# Patient Record
Sex: MALE | Race: WHITE | Employment: FULL TIME | URBAN - METROPOLITAN AREA
[De-identification: names, ages, dates, MRNs, and addresses within clinical notes are randomized per-mention and may not be internally consistent; named-entity substitution may affect disease eponyms.]

---

## 2017-01-17 ENCOUNTER — OFFICE VISIT (OUTPATIENT)
Dept: INTERNAL MEDICINE CLINIC | Age: 52
End: 2017-01-17

## 2017-01-17 VITALS
OXYGEN SATURATION: 98 % | HEIGHT: 73 IN | SYSTOLIC BLOOD PRESSURE: 134 MMHG | DIASTOLIC BLOOD PRESSURE: 82 MMHG | TEMPERATURE: 97.9 F | HEART RATE: 55 BPM | BODY MASS INDEX: 25.29 KG/M2 | WEIGHT: 190.8 LBS | RESPIRATION RATE: 16 BRPM

## 2017-01-17 DIAGNOSIS — F41.1 GENERALIZED ANXIETY DISORDER: ICD-10-CM

## 2017-01-17 DIAGNOSIS — I10 ESSENTIAL HYPERTENSION: ICD-10-CM

## 2017-01-17 DIAGNOSIS — Z00.00 ROUTINE GENERAL MEDICAL EXAMINATION AT A HEALTH CARE FACILITY: Primary | ICD-10-CM

## 2017-01-17 RX ORDER — BUSPIRONE HYDROCHLORIDE 15 MG/1
15 TABLET ORAL DAILY
Qty: 30 TAB | Refills: 0
Start: 2017-01-17 | End: 2017-04-04 | Stop reason: ALTCHOICE

## 2017-01-17 RX ORDER — ENALAPRIL MALEATE 10 MG/1
TABLET ORAL 2 TIMES DAILY
COMMUNITY
End: 2017-05-05 | Stop reason: SDUPTHER

## 2017-01-17 RX ORDER — BUSPIRONE HYDROCHLORIDE 15 MG/1
15 TABLET ORAL
COMMUNITY
End: 2017-01-17 | Stop reason: SDUPTHER

## 2017-01-17 NOTE — PROGRESS NOTES
CC:  Chief Complaint   Patient presents with    Hypertension    Cholesterol Problem    Depression     HISTORY OF PRESENT ILLNESS  Lizzy Anne is a 49 y.o. male. Presents for follow up evaluation. Last seen in clinic on 3/15/16. He has HTN, hyperlipidemia, irritable bowel syndrome (IBS) with abd pain, depression, and anxiety disorder. Reports he experienced crampy abdominal pain, nausea, and vomiting for a few months. Found to have gallstones and underwent a lap cholecystectomy by Dr. Juan Guzman on 12/5/16. All of his symptoms have resolved. Feels much better now. Denies feeling depressed, but has some anxiety. Feels very concerned about presidential election results. His wife tells him that he is sometimes \"snappy\". Sometimes has feelings of dread, like something bad is going to happen; states he has not had that feeling in past few weeks. Taking Buspar 15 mg once daily only 1-2 times a week.      Soc Hx  . Has 2 daughters (oldest in college at [employer] is pre-med;  youngest daughter also wants to be a doctor). Works in "[employer] for [employer]; works from home. Never smoker. Drinks 1-2 beers a month (previously, 3-4 beers a day on weekends). Denies recreational drug use.  Does not get regular exercise.     Health Maintenance  Td: 9/21/15  Eye exam: 2015 (Dr. Arlin Carvajal)  Colonoscopy: 8/18/2014 (Dr. Stephanie Wesley); normal, repeat in 10 yrs (2024)    ROS  Constitutional: negative for fevers, chills, night sweats  ENT:   negative for sore throat, nasal congestion, ear pains, hoarseness  Respiratory:  negative for cough, hemoptysis, dyspnea,wheezing  CV:   negative for chest pain, palpitations, lower extremity edema  GI:   negative for heartburn, abd pain, nausea, vomiting, diarrhea, constipation  Genitourinary: negative for frequency, dysuria and hematuria  Integument:  negative for rash and pruritus  Musculoskel: negative for myalgias, arthralgias, back pain, muscle weakness, joint pain  Neurological:  negative for headaches, dizziness, gait problems  Behavl/Psych: negative for feelings of depression; positive for anxiety     Patient Active Problem List   Diagnosis Code    IBS (irritable bowel syndrome) K58.9    Essential hypertension I10    Hyperlipidemia E78.5    Depression with anxiety F41.8    Nephrolithiasis N20.0     Past Medical History   Diagnosis Date    ADD (attention deficit disorder)     Cholelithiasis 9/29/14     CT in 8/14. Saw Dr. Robyn Villasenor; asymptomatic gallstones    GERD (gastroesophageal reflux disease)      possibly    Hyperlipidemia      high cholesterol    Hypertension     Nephrolithiasis      KIDNEY STONES    Other recurrent depressive disorders (HCC)      generalized anxiety disorder     Allergies   Allergen Reactions    Zoloft [Sertraline] Other (comments)     Abdominal pain, nausea     Current Outpatient Prescriptions   Medication Sig Dispense Refill    busPIRone (BUSPAR) 15 mg tablet Take 15 mg by mouth. Patient taking once or twice a week      enalapril (VASOTEC) 10 mg tablet Take  by mouth two (2) times a day.  simvastatin (ZOCOR) 80 mg tablet take 1 tablet by mouth every evening 30 Tab 2         PHYSICAL EXAM  Visit Vitals    /82 (BP 1 Location: Left arm, BP Patient Position: Sitting)    Pulse (!) 55    Temp 97.9 °F (36.6 °C) (Oral)    Resp 16    Ht 6' 1\" (1.854 m)    Wt 190 lb 12.8 oz (86.5 kg)    SpO2 98%    BMI 25.17 kg/m2   11 lb wt loss over past 10 months (from 201 lbs in 2/16 to 190 lbs today)    General: Well-developed and well-nourished, no distress. HEENT:  Head normocephalic/atraumatic, no scleral icterus  Lungs:  Clear to ausculation bilaterally. Good air movement. Heart:  Bradycardic, regular rhythm, normal S1 and S2, no murmur, gallop, or rub  Abdomen: Soft, small well-healed scars, non-distended, normal bowel sounds, non-tender, no guarding, masses, rebound tenderness, or HSM. Extremities: No clubbing, cyanosis, or edema.    Neurological: Alert and oriented. Psychiatric: Normal mood and affect. Behavior is normal.     Results for orders placed or performed during the hospital encounter of 12/01/16   EKG, 12 LEAD, INITIAL   Result Value Ref Range    Ventricular Rate 57 BPM    Atrial Rate 57 BPM    P-R Interval 154 ms    QRS Duration 98 ms    Q-T Interval 404 ms    QTC Calculation (Bezet) 393 ms    Calculated P Axis 67 degrees    Calculated R Axis 74 degrees    Calculated T Axis 41 degrees    Diagnosis       Sinus bradycardia  When compared with ECG of 23-FEB-2009 12:23,  Previous ECG has undetermined rhythm, needs review  Confirmed by [facility] ([id number]) on 12/3/2016 3:17:23 PM           ASSESSMENT AND PLAN    ICD-10-CM ICD-9-CM    1. Routine general medical examination at a health care facility O61.11 E53.3 METABOLIC PANEL, COMPREHENSIVE      CBC WITH AUTOMATED DIFF      HEMOGLOBIN A1C WITH EAG      TSH AND FREE T4      LIPID PANEL      PSA SCREENING (SCREENING)      CHRONIC HEPATITIS PANEL      CHRONIC HEPATITIS PANEL   2. Essential hypertension I10 401.9    3. Generalized anxiety disorder F41.1 300.02 busPIRone (BUSPAR) 15 mg tablet       Bravo Stacy was seen today for hypertension, cholesterol problem and depression. Diagnoses and all orders for this visit:    Routine general medical examination at a health care facility  -     METABOLIC PANEL, COMPREHENSIVE; Future  -     CBC WITH AUTOMATED DIFF; Future  -     HEMOGLOBIN A1C WITH EAG; Future  -     TSH AND FREE T4; Future  -     LIPID PANEL; Future  -     PSA SCREENING (SCREENING); Future  -     CHRONIC HEPATITIS PANEL  -     CHRONIC HEPATITIS PANEL; Future    Essential hypertension  Well-controlled by diet control alone. Generalized anxiety disorder  Increase buspirone to 5 days a week to improve anxiety. -     busPIRone (BUSPAR) 15 mg tablet; Take 1 Tab by mouth daily. Take on Monday-Friday.     Follow-up Disposition:  Return in about 6 months (around 7/17/2017), or if symptoms worsen or fail to improve, for HTN, anxiety. Provided patient and/or family with advanced directive information and answered pertinent questions. Encouraged patient to provide a copy of advanced directive to the office when available. I have discussed the diagnosis with the patient and the intended plan as seen in the above orders. Patient is in agreement. The patient has received an after-visit summary and questions were answered concerning future plans. I have discussed medication side effects and warnings with the patient as well.

## 2017-01-17 NOTE — PROGRESS NOTES
Reviewed record  In preparation for visit and have obtained necessary documentation. 1. Have you been to the ER, urgent care clinic since your last visit? Hospitalized since your last visit?had gall bladder surgery with AWILDA Valdez   2. Have you seen or consulted any other health care providers outside of the [address] [place] [address] since your last visit? Include any pap smears or colon screening. No  Patient declines information on advanced directives.

## 2017-01-17 NOTE — MR AVS SNAPSHOT
Visit Information Date & Time Provider Department Dept. Phone Encounter #  
 1/17/2017  2:00 PM Sherrell MonkMai [phone] [id number] Follow-up Instructions Return in about 6 months (around 7/17/2017), or if symptoms worsen or fail to improve, for HTN, anxiety. Upcoming Health Maintenance Date Due COLONOSCOPY 8/18/2024 DTaP/Tdap/Td series (2 - Td) 9/21/2025 Allergies as of 1/17/2017  Review Complete On: 1/17/2017 By: Sherrell Monk MD  
  
 Severity Noted Reaction Type Reactions Zoloft [Sertraline]  06/17/2015    Other (comments) Abdominal pain, nausea Current Immunizations  Reviewed on 9/28/2016 Name Date Hep B Vaccine (Adult) 7/13/2016, 4/18/2016, 3/15/2016 Influenza Vaccine Loletha Ringer) 9/27/2016, 10/9/2015 Tdap 9/21/2015 Not reviewed this visit You Were Diagnosed With   
  
 Codes Comments Routine general medical examination at a health care facility    -  Primary ICD-10-CM: Z00.00 ICD-9-CM: V70.0 Essential hypertension     ICD-10-CM: I10 
ICD-9-CM: 401.9 Generalized anxiety disorder     ICD-10-CM: F41.1 ICD-9-CM: 300.02 Vitals BP Pulse Temp Resp Height(growth percentile) Weight(growth percentile) 134/82 (BP 1 Location: Left arm, BP Patient Position: Sitting) (!) 55 97.9 °F (36.6 °C) (Oral) 16 6' 1\" (1.854 m) 190 lb 12.8 oz (86.5 kg) SpO2 BMI Smoking Status 98% 25.17 kg/m2 Never Smoker BMI and BSA Data Body Mass Index Body Surface Area  
 25.17 kg/m 2 2.11 m 2 Preferred Pharmacy Pharmacy Name Phone [id number] [address], [address] [phone] Your Updated Medication List  
  
   
This list is accurate as of: 1/17/17  2:38 PM.  Always use your most recent med list.  
  
  
  
  
 busPIRone 15 mg tablet Commonly known as:  BUSPAR Take 15 mg by mouth. Patient taking once or twice a week * VASOTEC 10 mg tablet Generic drug:  enalapril Take  by mouth two (2) times a day. * enalapril 10 mg tablet Commonly known as:  VASOTEC  
take 1 1/2 tablets by mouth once daily for hypertension  
  
 simvastatin 80 mg tablet Commonly known as:  ZOCOR  
take 1 tablet by mouth every evening * Notice: This list has 2 medication(s) that are the same as other medications prescribed for you. Read the directions carefully, and ask your doctor or other care provider to review them with you. We Performed the Following CHRONIC HEPATITIS PANEL [[id number] Custom] Follow-up Instructions Return in about 6 months (around 7/17/2017), or if symptoms worsen or fail to improve, for HTN, anxiety. To-Do List   
 01/18/2017 Lab:  CBC WITH AUTOMATED DIFF   
  
 01/18/2017 Lab:  CHRONIC HEPATITIS PANEL   
  
 01/18/2017 Lab:  HEMOGLOBIN A1C WITH EAG   
  
 01/18/2017 Lab:  LIPID PANEL   
  
 01/18/2017 Lab:  METABOLIC PANEL, COMPREHENSIVE   
  
 01/18/2017 Lab:  PSA SCREENING (SCREENING)   
  
 01/18/2017 Lab:  TSH AND FREE T4 Patient Instructions Well Visit, Men 48 to 72: Care Instructions Your Care Instructions Physical exams can help you stay healthy. Your doctor has checked your overall health and may have suggested ways to take good care of yourself. He or she also may have recommended tests. At home, you can help prevent illness with healthy eating, regular exercise, and other steps. Follow-up care is a key part of your treatment and safety. Be sure to make and go to all appointments, and call your doctor if you are having problems. It's also a good idea to know your test results and keep a list of the medicines you take. How can you care for yourself at home? · Reach and stay at a healthy weight. This will lower your risk for many problems, such as obesity, diabetes, heart disease, and high blood pressure. · Get at least 30 minutes of exercise on most days of the week. Walking is a good choice. You also may want to do other activities, such as running, swimming, cycling, or playing tennis or team sports. · Do not smoke. Smoking can make health problems worse. If you need help quitting, talk to your doctor about stop-smoking programs and medicines. These can increase your chances of quitting for good. · Protect your skin from too much sun. When you're outdoors from 10 a.m. to 4 p.m., stay in the shade or cover up with clothing and a hat with a wide brim. Wear sunglasses that block UV rays. Even when it's cloudy, put broad-spectrum sunscreen (SPF 30 or higher) on any exposed skin. · See a dentist one or two times a year for checkups and to have your teeth cleaned. · Wear a seat belt in the car. · Limit alcohol to 2 drinks a day. Too much alcohol can cause health problems. Follow your doctor's advice about when to have certain tests. These tests can spot problems early. · Cholesterol. Your doctor will tell you how often to have this done based on your overall health and other things that can increase your risk for heart attack and stroke. · Blood pressure. Have your blood pressure checked during a routine doctor visit. Your doctor will tell you how often to check your blood pressure based on your age, your blood pressure results, and other factors. · Prostate exam. Talk to your doctor about whether you should have a blood test (called a PSA test) for prostate cancer. Experts disagree on whether men should have this test. Some experts recommend that you discuss the benefits and risks of the test with your doctor. · Diabetes. Ask your doctor whether you should have tests for diabetes. · Vision. Some experts recommend that you have yearly exams for glaucoma and other age-related eye problems starting at age 48. · Hearing. Tell your doctor if you notice any change in your hearing.  You can have tests to find out how well you hear. · Colon cancer. You should begin tests for colon cancer at age 48. You may have one of several tests. Your doctor will tell you how often to have tests based on your age and risk. Risks include whether you already had a precancerous polyp removed from your colon or whether your parent, brother, sister, or child has had colon cancer. · Heart attack and stroke risk. At least every 4 to 6 years, you should have your risk for heart attack and stroke assessed. Your doctor uses factors such as your age, blood pressure, cholesterol, and whether you smoke or have diabetes to show what your risk for a heart attack or stroke is over the next 10 years. · Abdominal aortic aneurysm. Ask your doctor whether you should have a test to check for an aneurysm. You may need a test if you ever smoked or if your parent, brother, sister, or child has had an aneurysm. When should you call for help? Watch closely for changes in your health, and be sure to contact your doctor if you have any problems or symptoms that concern you. Where can you learn more? Go to http://kenneth-peter.info/. Enter F198 in the search box to learn more about \"Well Visit, Men 48 to 72: Care Instructions. \" Current as of: July 19, 2016 Content Version: 11.1 © 5154-8927 [employer]. Care instructions adapted under license by [employer] (which disclaims liability or warranty for this information). If you have questions about a medical condition or this instruction, always ask your healthcare professional. [place] [address] any warranty or liability for your use of this information. Introducing [facility]! Ora Viveros introduces [employer] patient portal. Now you can access parts of your medical record, email your doctor's office, and request medication refills online.    
 
1. In your internet browser, go to [email or web address] "[email or web address]/BizBraghart 2. Click on the First Time User? Click Here link in the Sign In box. You will see the New Member Sign Up page. 3. Enter your SpaBooker Access Code exactly as it appears below. You will not need to use this code after youve completed the sign-up process. If you do not sign up before the expiration date, you must request a new code. · SpaBooker Access Code: [email or web address] Expires: 2/15/2017 10:33 PM 
 
4. Enter the last four digits of your Social Security Number (xxxx) and Date of Birth (mm/dd/yyyy) as indicated and click Submit. You will be taken to the next sign-up page. 5. Create a Whisk (formerly Zypsee)t ID. This will be your SpaBooker login ID and cannot be changed, so think of one that is secure and easy to remember. 6. Create a SpaBooker password. You can change your password at any time. 7. Enter your Password Reset Question and Answer. This can be used at a later time if you forget your password. 8. Enter your e-mail address. You will receive e-mail notification when new information is available in [address] 9. Click Sign Up. You can now view and download portions of your medical record. 10. Click the Download Summary menu link to download a portable copy of your medical information. If you have questions, please visit the Frequently Asked Questions section of the SpaBooker website. Remember, SpaBooker is NOT to be used for urgent needs. For medical emergencies, dial 911. Now available from your iPhone and Android! Please provide this summary of care documentation to your next provider. Your primary care clinician is listed as Kalee Burns. If you have any questions after today's visit, please call [phone].

## 2017-01-17 NOTE — PATIENT INSTRUCTIONS

## 2017-01-20 ENCOUNTER — APPOINTMENT (OUTPATIENT)
Dept: INTERNAL MEDICINE CLINIC | Age: 52
End: 2017-01-20

## 2017-01-20 DIAGNOSIS — Z00.00 ROUTINE GENERAL MEDICAL EXAMINATION AT A HEALTH CARE FACILITY: ICD-10-CM

## 2017-01-23 LAB
ALBUMIN SERPL-MCNC: 4.7 G/DL (ref 3.5–5.5)
ALBUMIN/GLOB SERPL: 1.9 {RATIO} (ref 1.1–2.5)
ALP SERPL-CCNC: 88 IU/L (ref 39–117)
ALT SERPL-CCNC: 24 IU/L (ref 0–44)
AST SERPL-CCNC: 17 IU/L (ref 0–40)
BASOPHILS # BLD AUTO: 0 X10E3/UL (ref 0–0.2)
BASOPHILS NFR BLD AUTO: 0 %
BILIRUB SERPL-MCNC: 0.6 MG/DL (ref 0–1.2)
BUN SERPL-MCNC: 10 MG/DL (ref 6–24)
BUN/CREAT SERPL: 11 (ref 9–20)
CALCIUM SERPL-MCNC: 9.5 MG/DL (ref 8.7–10.2)
CHLORIDE SERPL-SCNC: 100 MMOL/L (ref 96–106)
CHOLEST SERPL-MCNC: 179 MG/DL (ref 100–199)
CO2 SERPL-SCNC: 28 MMOL/L (ref 18–29)
COMMENT, 144067: NORMAL
CREAT SERPL-MCNC: 0.95 MG/DL (ref 0.76–1.27)
EOSINOPHIL # BLD AUTO: 0.1 X10E3/UL (ref 0–0.4)
EOSINOPHIL NFR BLD AUTO: 2 %
ERYTHROCYTE [DISTWIDTH] IN BLOOD BY AUTOMATED COUNT: 14.2 % (ref 12.3–15.4)
EST. AVERAGE GLUCOSE BLD GHB EST-MCNC: 111 MG/DL
GLOBULIN SER CALC-MCNC: 2.5 G/DL (ref 1.5–4.5)
GLUCOSE SERPL-MCNC: 89 MG/DL (ref 65–99)
HBA1C MFR BLD: 5.5 % (ref 4.8–5.6)
HBV CORE AB SERPL QL IA: NEGATIVE
HBV CORE IGM SERPL QL IA: NEGATIVE
HBV E AB SERPL QL IA: NEGATIVE
HBV E AG SERPL QL IA: NEGATIVE
HBV SURFACE AB SER QL: NON REACTIVE
HBV SURFACE AG SERPL QL IA: NEGATIVE
HCT VFR BLD AUTO: 43.9 % (ref 37.5–51)
HCV AB S/CO SERPL IA: <0.1 S/CO RATIO (ref 0–0.9)
HDLC SERPL-MCNC: 46 MG/DL
HGB BLD-MCNC: 14.6 G/DL (ref 12.6–17.7)
IMM GRANULOCYTES # BLD: 0 X10E3/UL (ref 0–0.1)
IMM GRANULOCYTES NFR BLD: 0 %
INTERPRETATION, 910389: NORMAL
LDLC SERPL CALC-MCNC: 94 MG/DL (ref 0–99)
LYMPHOCYTES # BLD AUTO: 1.7 X10E3/UL (ref 0.7–3.1)
LYMPHOCYTES NFR BLD AUTO: 36 %
MCH RBC QN AUTO: 30.2 PG (ref 26.6–33)
MCHC RBC AUTO-ENTMCNC: 33.3 G/DL (ref 31.5–35.7)
MCV RBC AUTO: 91 FL (ref 79–97)
MONOCYTES # BLD AUTO: 0.4 X10E3/UL (ref 0.1–0.9)
MONOCYTES NFR BLD AUTO: 9 %
NEUTROPHILS # BLD AUTO: 2.6 X10E3/UL (ref 1.4–7)
NEUTROPHILS NFR BLD AUTO: 53 %
PLATELET # BLD AUTO: 257 X10E3/UL (ref 150–379)
POTASSIUM SERPL-SCNC: 4.5 MMOL/L (ref 3.5–5.2)
PROT SERPL-MCNC: 7.2 G/DL (ref 6–8.5)
PSA SERPL-MCNC: 0.9 NG/ML (ref 0–4)
RBC # BLD AUTO: 4.83 X10E6/UL (ref 4.14–5.8)
SODIUM SERPL-SCNC: 143 MMOL/L (ref 134–144)
T4 FREE SERPL-MCNC: 1.16 NG/DL (ref 0.82–1.77)
TRIGL SERPL-MCNC: 194 MG/DL (ref 0–149)
TSH SERPL DL<=0.005 MIU/L-ACNC: 2.36 UIU/ML (ref 0.45–4.5)
VLDLC SERPL CALC-MCNC: 39 MG/DL (ref 5–40)
WBC # BLD AUTO: 4.8 X10E3/UL (ref 3.4–10.8)

## 2017-01-24 NOTE — PROGRESS NOTES
Inform patient that all his labs returned normal, including his kidney and liver tests, blood counts, diabetes screening test, thyroid, cholesterol, and prostate blood test. Keep up the good work!

## 2017-02-28 RX ORDER — SIMVASTATIN 80 MG/1
TABLET, FILM COATED ORAL
Qty: 30 TAB | Refills: 2 | Status: SHIPPED | OUTPATIENT
Start: 2017-02-28 | End: 2017-06-18 | Stop reason: SDUPTHER

## 2017-04-04 ENCOUNTER — OFFICE VISIT (OUTPATIENT)
Dept: INTERNAL MEDICINE CLINIC | Age: 52
End: 2017-04-04

## 2017-04-04 VITALS
TEMPERATURE: 98 F | DIASTOLIC BLOOD PRESSURE: 81 MMHG | SYSTOLIC BLOOD PRESSURE: 134 MMHG | HEART RATE: 69 BPM | RESPIRATION RATE: 16 BRPM | WEIGHT: 190 LBS | HEIGHT: 73 IN | OXYGEN SATURATION: 97 % | BODY MASS INDEX: 25.18 KG/M2

## 2017-04-04 DIAGNOSIS — I10 ESSENTIAL HYPERTENSION: ICD-10-CM

## 2017-04-04 DIAGNOSIS — K58.0 IRRITABLE BOWEL SYNDROME WITH DIARRHEA: ICD-10-CM

## 2017-04-04 DIAGNOSIS — F41.9 ANXIETY: ICD-10-CM

## 2017-04-04 DIAGNOSIS — R10.32 LEFT LOWER QUADRANT PAIN: Primary | ICD-10-CM

## 2017-04-04 RX ORDER — HYOSCYAMINE SULFATE 0.12 MG/1
TABLET, ORALLY DISINTEGRATING ORAL
Qty: 48 TAB | Refills: 2 | Status: SHIPPED | OUTPATIENT
Start: 2017-04-04 | End: 2019-06-19

## 2017-04-04 RX ORDER — BUSPIRONE HYDROCHLORIDE 15 MG/1
15 TABLET ORAL DAILY
Qty: 30 TAB | Refills: 2 | Status: SHIPPED | OUTPATIENT
Start: 2017-04-04 | End: 2017-07-07 | Stop reason: SDUPTHER

## 2017-04-04 RX ORDER — BUSPIRONE HYDROCHLORIDE 15 MG/1
15 TABLET ORAL 2 TIMES DAILY
COMMUNITY
End: 2017-04-04 | Stop reason: SDUPTHER

## 2017-04-04 NOTE — MR AVS SNAPSHOT
Visit Information Date & Time Provider Department Dept. Phone Encounter #  
 4/4/2017 11:10 AM MD Greg Melchor [phone] [id number] Follow-up Instructions Return in about 1 month (around 5/4/2017), or if symptoms worsen or fail to improve, for Anxiety. Your Appointments 7/17/2017  1:00 PM  
ROUTINE CARE with MD Greg Melchor [address] [facility]) Appt Note: 6mth f/u; HTN, anxiety [address] [facility] [address] [phone]  
  
   
 8 [facility] [address] Upcoming Health Maintenance Date Due COLONOSCOPY 8/18/2024 DTaP/Tdap/Td series (2 - Td) 9/21/2025 Allergies as of 4/4/2017  Review Complete On: 4/4/2017 By: Muriel Dong MD  
  
 Severity Noted Reaction Type Reactions Zoloft [Sertraline]  06/17/2015    Other (comments) Abdominal pain, nausea Current Immunizations  Reviewed on 9/28/2016 Name Date Hep B Vaccine (Adult) 7/13/2016, 4/18/2016, 3/15/2016 Influenza Vaccine Otila Bam) 9/27/2016, 10/9/2015 Tdap 9/21/2015 Not reviewed this visit You Were Diagnosed With   
  
 Codes Comments Left lower quadrant pain    -  Primary ICD-10-CM: R10.32 
ICD-9-CM: 789.04 Essential hypertension     ICD-10-CM: I10 
ICD-9-CM: 401.9 Irritable bowel syndrome with diarrhea     ICD-10-CM: K58.0 ICD-9-CM: 379.8 Anxiety     ICD-10-CM: F41.9 ICD-9-CM: 300.00 Vitals BP Pulse Temp Resp Height(growth percentile) Weight(growth percentile) 134/81 (BP 1 Location: Left arm, BP Patient Position: Sitting) 69 98 °F (36.7 °C) (Oral) 16 6' 1\" (1.854 m) 190 lb (86.2 kg) SpO2 BMI Smoking Status 97% 25.07 kg/m2 Never Smoker BMI and BSA Data Body Mass Index Body Surface Area 25.07 kg/m 2 2.11 m 2 Preferred Pharmacy Pharmacy Name Phone [id number] [id number], [address] [phone] Your Updated Medication List  
  
   
This list is accurate as of: 4/4/17 11:33 AM.  Always use your most recent med list.  
  
  
  
  
 busPIRone 15 mg tablet Commonly known as:  BUSPAR Take 1 Tab by mouth daily. Indications: GENERALIZED ANXIETY DISORDER  
  
 hyoscyamine sulfate 0.125 mg tablet Commonly known as:  CYSTOSPAZ  
take 1 tablet by mouth every 4 hours if needed  Indications: Irritable Bowel Syndrome  
  
 simvastatin 80 mg tablet Commonly known as:  ZOCOR  
take 1 tablet by mouth every evening VASOTEC 10 mg tablet Generic drug:  enalapril Take  by mouth two (2) times a day. Prescriptions Sent to Pharmacy Refills  
 busPIRone (BUSPAR) 15 mg tablet 2 Sig: Take 1 Tab by mouth daily. Indications: GENERALIZED ANXIETY DISORDER Class: Normal  
 Pharmacy: [address] Ph #: [phone] Route: Oral  
 hyoscyamine sulfate (CYSTOSPAZ) 0.125 mg tablet 2 Sig: take 1 tablet by mouth every 4 hours if needed  Indications: Irritable Bowel Syndrome Class: Normal  
 Pharmacy: [address] Ph #: [phone] Follow-up Instructions Return in about 1 month (around 5/4/2017), or if symptoms worsen or fail to improve, for Anxiety. Patient Instructions Abdominal Pain: Care Instructions Your Care Instructions Abdominal pain has many possible causes. Some aren't serious and get better on their own in a few days. Others need more testing and treatment. If your pain continues or gets worse, you need to be rechecked and may need more tests to find out what is wrong. You may need surgery to correct the problem. Don't ignore new symptoms, such as fever, nausea and vomiting, urination problems, pain that gets worse, and dizziness. These may be signs of a more serious problem. Your doctor may have recommended a follow-up visit in the next 8 to 12 hours. If you are not getting better, you may need more tests or treatment. The doctor has checked you carefully, but problems can develop later. If you notice any problems or new symptoms, get medical treatment right away. Follow-up care is a key part of your treatment and safety. Be sure to make and go to all appointments, and call your doctor if you are having problems. It's also a good idea to know your test results and keep a list of the medicines you take. How can you care for yourself at home? · Rest until you feel better. · To prevent dehydration, drink plenty of fluids, enough so that your urine is light yellow or clear like water. Choose water and other caffeine-free clear liquids until you feel better. If you have kidney, heart, or liver disease and have to limit fluids, talk with your doctor before you increase the amount of fluids you drink. · If your stomach is upset, eat mild foods, such as rice, dry toast or crackers, bananas, and applesauce. Try eating several small meals instead of two or three large ones. · Wait until 48 hours after all symptoms have gone away before you have spicy foods, alcohol, and drinks that contain caffeine. · Do not eat foods that are high in fat. · Avoid anti-inflammatory medicines such as aspirin, ibuprofen (Advil, Motrin), and naproxen (Aleve). These can cause stomach upset. Talk to your doctor if you take daily aspirin for another health problem. When should you call for help? Call 911 anytime you think you may need emergency care. For example, call if: 
· You passed out (lost consciousness). · You pass maroon or very bloody stools. · You vomit blood or what looks like coffee grounds. · You have new, severe belly pain. Call your doctor now or seek immediate medical care if: 
· Your pain gets worse, especially if it becomes focused in one area of your belly. · You have a new or higher fever. · Your stools are black and look like tar, or they have streaks of blood. · You have unexpected vaginal bleeding. · You have symptoms of a urinary tract infection. These may include: 
¨ Pain when you urinate. ¨ Urinating more often than usual. 
¨ Blood in your urine. · You are dizzy or lightheaded, or you feel like you may faint. Watch closely for changes in your health, and be sure to contact your doctor if: 
· You are not getting better after 1 day (24 hours). Where can you learn more? Go to http://kenneth-peter.info/. Enter S695 in the search box to learn more about \"Abdominal Pain: Care Instructions. \" Current as of: May 27, 2016 Content Version: 11.2 © 9504-2444 [employer]. Care instructions adapted under license by [employer] (which disclaims liability or warranty for this information). If you have questions about a medical condition or this instruction, always ask your healthcare professional. [address] any warranty or liability for your use of this information. Introducing [facility]! Norberto Erickson introduces Daric patient portal. Now you can access parts of your medical record, email your doctor's office, and request medication refills online. 1. In your internet browser, go to [email or web address] 2. Click on the First Time User? Click Here link in the Sign In box. You will see the New Member Sign Up page. 3. Enter your Daric Access Code exactly as it appears below. You will not need to use this code after youve completed the sign-up process. If you do not sign up before the expiration date, you must request a new code. · Daric Access Code: [email or web address] Expires: 7/3/2017 11:16 AM 
 
4. Enter the last four digits of your Social Security Number (xxxx) and Date of Birth (mm/dd/yyyy) as indicated and click Submit.  You will be taken to the next sign-up page. 5. Create a [employer] ID. This will be your [employer] login ID and cannot be changed, so think of one that is secure and easy to remember. 6. Create a [employer] password. You can change your password at any time. 7. Enter your Password Reset Question and Answer. This can be used at a later time if you forget your password. 8. Enter your e-mail address. You will receive e-mail notification when new information is available in [address]. 9. Click Sign Up. You can now view and download portions of your medical record. 10. Click the Download Summary menu link to download a portable copy of your medical information. If you have questions, please visit the Frequently Asked Questions section of the [employer] website. Remember, [employer] is NOT to be used for urgent needs. For medical emergencies, dial 911. Now available from your iPhone and Android! Please provide this summary of care documentation to your next provider. Your primary care clinician is listed as Chris Lucero. If you have any questions after today's visit, please call [phone].

## 2017-04-04 NOTE — PROGRESS NOTES
CC:  Chief Complaint   Patient presents with    Abdominal Pain     LLQ pain/not constant    Medication Refill     buspar -not taking on regular basis     HISTORY OF PRESENT ILLNESS  Jose Agrawal is a 49 y.o. male. Presents for acute visit with complaint of LLQ abdominal pain intermittently since  that worsened about a week ago. Thought at first it was his IBS pain though it felt different; current pain is achy, IBS pain is crampy; took hyoscyamine for a week without improvement. Current LLQ pain is worse with certain changes in body position and when he coughs or sneezes. Worried that he may have a hernia. Denies any injury or trauma, but did much yard work a little over a week ago. Also complains of increased anxiety. Has been taking only 1/2 tablet of Buspar 15 mg daily for past week. Previously was taking 1 tablet 1-2 times a week. PMH: He has HTN, hyperlipidemia, irritable bowel syndrome (IBS) with abd pain, depression, and anxiety disorder. Underwent lap cholecystectomy for gallstones by Dr. Florian Blake on 16.       Soc Hx  . Has 2 daughters (oldest in college at [employer] is pre-med; youngest daughter also wants to be a dentist). Works in IT for [employer]; works from home. Never smoker. Drinks 1-2 beers a month (previously, 3-4 beers a day on weekends). Denies recreational drug use.  Does not get regular exercise.      Health Maintenance  Td: 9/21/15  Eye exam:  (Dr. Jenny Pat)  Colonoscopy: 2014 (Dr. Carolee Gamez); normal, repeat in 10 yrs ()    ROS  Constitutional: negative for fevers, chills, night sweats  ENT:   negative for sore throat, nasal congestion, ear pains  Respiratory:  negative for cough, hemoptysis, dyspnea, wheezing  CV:   negative for chest pain, palpitations, lower extremity edema  GI:   negative for heartburn, abd pain, nausea, vomiting, diarrhea, constipation  Genitourinary: negative for frequency, dysuria and hematuria  Integument:  negative for rash and pruritus  Musculoskel: negative for myalgias, arthralgias, back pain, muscle weakness, joint pain  Neurological:  negative for headaches, dizziness, vertigo, gait problems  Behavl/Psych: negative for feelings of anxiety, depression, mood change     Patient Active Problem List   Diagnosis Code    IBS (irritable bowel syndrome) K58.9    Essential hypertension I10    Hyperlipidemia E78.5    Depression with anxiety F41.8    Nephrolithiasis N20.0     Past Medical History:   Diagnosis Date    ADD (attention deficit disorder)     Cholelithiasis 9/29/14    CT in 8/14. Saw Dr. Saida Sandy; asymptomatic gallstones    GERD (gastroesophageal reflux disease)     possibly    Hyperlipidemia     high cholesterol    Hypertension     Nephrolithiasis     KIDNEY STONES    Other recurrent depressive disorders (HCC)     generalized anxiety disorder     Allergies   Allergen Reactions    Zoloft [Sertraline] Other (comments)     Abdominal pain, nausea     Current Outpatient Prescriptions   Medication Sig Dispense Refill    busPIRone (BUSPAR) 15 mg tablet Take 15 mg by mouth two (2) times a day.  simvastatin (ZOCOR) 80 mg tablet take 1 tablet by mouth every evening 30 Tab 2    enalapril (VASOTEC) 10 mg tablet Take  by mouth two (2) times a day. PHYSICAL EXAM  Visit Vitals    /81 (BP 1 Location: Left arm, BP Patient Position: Sitting)    Pulse 69    Temp 98 °F (36.7 °C) (Oral)    Resp 16    Ht 6' 1\" (1.854 m)    Wt 190 lb (86.2 kg)    SpO2 97%    BMI 25.07 kg/m2     General: Well-developed and well-nourished, no distress. HEENT:  Head normocephalic/atraumatic, no scleral icterus  Lungs:  Clear to ausculation bilaterally. Good air movement. Heart:  Regular rate and rhythm, normal S1 and S2, no murmur, gallop, or rub  Abdomen: Soft, non-distended, normal bowel sounds, no tenderness, no masses, rebound tenderness, or HSM. Extremities: No clubbing, cyanosis, or edema.    Neurological: Alert and oriented. Psychiatric: Normal mood and affect. Behavior is normal.         ASSESSMENT AND PLAN    ICD-10-CM ICD-9-CM    1. Left lower quadrant pain R10.32 789.04    2. Essential hypertension I10 401.9    3. Irritable bowel syndrome with diarrhea K58.0 564.1 hyoscyamine sulfate (CYSTOSPAZ) 0.125 mg tablet   4. Anxiety F41.9 300.00 busPIRone (BUSPAR) 15 mg tablet       Tremaine Davies was seen today for abdominal pain and medication refill. Diagnoses and all orders for this visit:    Left lower quadrant pain  Most likely secondary to muscle strain. Recommended warm compress and OTC ibuprofen or acetaminophen. Essential hypertension  Well-controlled. Continue present management. Irritable bowel syndrome with diarrhea  -     Refill hyoscyamine sulfate (CYSTOSPAZ) 0.125 mg tablet; take 1 tablet by mouth every 4 hours if needed  Indications: Irritable Bowel Syndrome    Anxiety  -     Refill busPIRone (BUSPAR) 15 mg tablet; Take 1 Tab by mouth daily. Indications: GENERALIZED ANXIETY DISORDER    Follow-up Disposition:  Return in about 1 month (around 5/4/2017), or if symptoms worsen or fail to improve, for Anxiety. Provided patient and/or family with advanced directive information and answered pertinent questions. Encouraged patient to provide a copy of advanced directive to the office when available. I have discussed the diagnosis with the patient and the intended plan as seen in the above orders. Patient is in agreement. The patient has received an after-visit summary and questions were answered concerning future plans. I have discussed medication side effects and warnings with the patient as well.

## 2017-04-04 NOTE — PATIENT INSTRUCTIONS

## 2017-04-04 NOTE — PROGRESS NOTES
Reviewed record  In preparation for visit and have obtained necessary documentation. 1. Have you been to the ER, urgent care clinic since your last visit? Hospitalized since your last visit?no  2. Have you seen or consulted any other health care providers outside of the [address] [place] [address] since your last visit? Include any pap smears or colon screening. No  Patient has been given information on advanced directives at a previous visit.

## 2017-05-04 ENCOUNTER — OFFICE VISIT (OUTPATIENT)
Dept: INTERNAL MEDICINE CLINIC | Age: 52
End: 2017-05-04

## 2017-05-04 VITALS
HEIGHT: 73 IN | BODY MASS INDEX: 24.78 KG/M2 | TEMPERATURE: 98 F | WEIGHT: 187 LBS | OXYGEN SATURATION: 98 % | RESPIRATION RATE: 16 BRPM | HEART RATE: 63 BPM | DIASTOLIC BLOOD PRESSURE: 80 MMHG | SYSTOLIC BLOOD PRESSURE: 122 MMHG

## 2017-05-04 DIAGNOSIS — I10 ESSENTIAL HYPERTENSION: ICD-10-CM

## 2017-05-04 DIAGNOSIS — F41.9 ANXIETY: Primary | ICD-10-CM

## 2017-05-04 DIAGNOSIS — R10.32 LEFT LOWER QUADRANT PAIN: ICD-10-CM

## 2017-05-04 DIAGNOSIS — K58.0 IRRITABLE BOWEL SYNDROME WITH DIARRHEA: ICD-10-CM

## 2017-05-04 NOTE — PROGRESS NOTES
Reviewed record  In preparation for visit and have obtained necessary documentation. 1. Have you been to the ER, urgent care clinic since your last visit? Hospitalized since your last visit?no  2. Have you seen or consulted any other health care providers outside of the [address] [place] [address] since your last visit? Include any pap smears or colon screening. No  Patient declines information on advanced directives.

## 2017-05-04 NOTE — PROGRESS NOTES
CC:  Chief Complaint   Patient presents with    Anxiety    Other     lower left abdominal pain     HISTORY OF PRESENT ILLNESS  Alisa Boggs is a 49 y.o. male. Presents for 1 month follow up evaluation. He has HTN, hyperlipidemia, irritable bowel syndrome (IBS) with abd pain, depression, and anxiety disorder. Underwent lap cholecystectomy for gallstones by Dr. Lilliam Waters on 12/5/16. Reports his anxiety has been better since increasing Buspar to 15 mg daily. LLQ abdominal pain also resolved until this morning when he had 3/10 pain after doing light yoga. Soc Hx  . Has 2 daughters (oldest in college at [employer] is pre-med; youngest daughter wants to be a dentist). 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 Never smoker. Drinks 1-2 beers a month (previously, 3-4 beers a day on weekends). Denies recreational drug use.  Does not get regular exercise.      Health Maintenance  Td: 9/21/15  Eye exam: 2015 (Dr. Jose E Lares)  Colonoscopy: 8/18/2014 (Dr. Janna Tucker); normal, repeat in 10 yrs (2024)     ROS  Constitutional: negative for fevers, chills, night sweats  ENT:   negative for sore throat, nasal congestion, ear pains, hoarseness  Respiratory:  negative for cough, hemoptysis, dyspnea,wheezing  CV:   negative for chest pain, palpitations, lower extremity edema  GI:   negative for heartburn, nausea, vomiting, diarrhea, constipation  Genitourinary: negative for frequency, dysuria and hematuria  Integument:  negative for rash and pruritus  Musculoskel: negative for myalgias, arthralgias, back pain, muscle weakness, joint pain  Neurological:  negative for headaches, dizziness, vertigo, gait problems  Behavl/Psych: negative for feelings of depression, mood change     Patient Active Problem List   Diagnosis Code    IBS (irritable bowel syndrome) K58.9    Essential hypertension I10    Hyperlipidemia E78.5    Depression with anxiety F41.8    Nephrolithiasis N20.0     Past Medical History:   Diagnosis Date    ADD (attention deficit disorder)     Cholelithiasis 9/29/14    CT in 8/14. Saw Dr. Ronald Zepeda; asymptomatic gallstones    GERD (gastroesophageal reflux disease)     possibly    Hyperlipidemia     high cholesterol    Hypertension     Nephrolithiasis     KIDNEY STONES    Other recurrent depressive disorders (HCC)     generalized anxiety disorder     Allergies   Allergen Reactions    Zoloft [Sertraline] Other (comments)     Abdominal pain, nausea     Current Outpatient Prescriptions   Medication Sig Dispense Refill    busPIRone (BUSPAR) 15 mg tablet Take 1 Tab by mouth daily. Indications: GENERALIZED ANXIETY DISORDER 30 Tab 2    hyoscyamine sulfate (CYSTOSPAZ) 0.125 mg tablet take 1 tablet by mouth every 4 hours if needed  Indications: Irritable Bowel Syndrome 48 Tab 2    simvastatin (ZOCOR) 80 mg tablet take 1 tablet by mouth every evening 30 Tab 2    enalapril (VASOTEC) 10 mg tablet Take  by mouth two (2) times a day. PHYSICAL EXAM  Visit Vitals    /80 (BP 1 Location: Left arm, BP Patient Position: Sitting)    Pulse 63    Temp 98 °F (36.7 °C) (Oral)    Resp 16    Ht 6' 1\" (1.854 m)    Wt 187 lb (84.8 kg)    SpO2 98%    BMI 24.67 kg/m2       General: Well-developed and well-nourished, no distress. HEENT:  Head normocephalic/atraumatic, no scleral icterus  Lungs:  Clear to ausculation bilaterally. Good air movement. Heart:  Regular rate and rhythm, normal S1 and S2, no murmur, gallop, or rub  Extremities: No clubbing, cyanosis, or edema. Neurological: Alert and oriented. Psychiatric: Normal mood and affect. Behavior is normal.         ASSESSMENT AND PLAN    ICD-10-CM ICD-9-CM    1. Anxiety F41.9 300.00    2. Essential hypertension I10 401.9    3. Left lower quadrant pain R10.32 789.04    4. Irritable bowel syndrome with diarrhea K58.0 564.1      Generalized anxiety better controlled.  LLQ abd pain most likely from muscle strain; also consider IBS-related although it does not feel like his typical IBS pain. HTN well-controlled. Current treatment plan is effective. No change in therapy. Reviewed diet and exercise. Follow-up Disposition:  Return in about 3 months (around 8/4/2017), or if symptoms worsen or fail to improve, for HTN, anxiety, LLQ abd pain. Provided patient and/or family with advanced directive information and answered pertinent questions. Encouraged patient to provide a copy of advanced directive to the office when available. I have discussed the diagnosis with the patient and the intended plan as seen in the above orders. Patient is in agreement. The patient has received an after-visit summary and questions were answered concerning future plans. I have discussed medication side effects and warnings with the patient as well.

## 2017-05-04 NOTE — MR AVS SNAPSHOT
Visit Information Date & Time Provider Department Dept. Phone Encounter #  
 5/4/2017 10:00 AM Greg Melchor [phone] [id number] Follow-up Instructions Return in about 3 months (around 8/4/2017), or if symptoms worsen or fail to improve, for HTN, anxiety, LLQ abd pain. Your Appointments 7/17/2017  1:00 PM  
ROUTINE CARE with MD Greg Melchor [facility] Appt Note: 6mth f/u; HTN, anxiety [address] [place] [address] [phone]  
  
   
 [address] Upcoming Health Maintenance Date Due INFLUENZA AGE 9 TO ADULT 8/1/2017 COLONOSCOPY 8/18/2024 DTaP/Tdap/Td series (2 - Td) 9/21/2025 Allergies as of 5/4/2017  Review Complete On: 5/4/2017 By: Muriel Dong MD  
  
 Severity Noted Reaction Type Reactions Zoloft [Sertraline]  06/17/2015    Other (comments) Abdominal pain, nausea Current Immunizations  Reviewed on 9/28/2016 Name Date Hep B Vaccine (Adult) 7/13/2016, 4/18/2016, 3/15/2016 Influenza Vaccine Otila Bam) 9/27/2016, 10/9/2015 Tdap 9/21/2015 Not reviewed this visit You Were Diagnosed With   
  
 Codes Comments Anxiety    -  Primary ICD-10-CM: F41.9 ICD-9-CM: 300.00 Essential hypertension     ICD-10-CM: I10 
ICD-9-CM: 401.9 Left lower quadrant pain     ICD-10-CM: R10.32 
ICD-9-CM: 789.04 Irritable bowel syndrome with diarrhea     ICD-10-CM: K58.0 ICD-9-CM: 207.2 Vitals BP Pulse Temp Resp Height(growth percentile) Weight(growth percentile) 122/80 (BP 1 Location: Left arm, BP Patient Position: Sitting) 63 98 °F (36.7 °C) (Oral) 16 6' 1\" (1.854 m) 187 lb (84.8 kg) SpO2 BMI Smoking Status 98% 24.67 kg/m2 Never Smoker Vitals History BMI and BSA Data Body Mass Index Body Surface Area  
 24.67 kg/m 2 2.09 m 2 Preferred Pharmacy Pharmacy Name Phone [facility] [address], [address] [phone] Your Updated Medication List  
  
   
This list is accurate as of: 5/4/17 10:27 AM.  Always use your most recent med list.  
  
  
  
  
 busPIRone 15 mg tablet Commonly known as:  BUSPAR Take 1 Tab by mouth daily. Indications: GENERALIZED ANXIETY DISORDER  
  
 hyoscyamine sulfate 0.125 mg tablet Commonly known as:  CYSTOSPAZ  
take 1 tablet by mouth every 4 hours if needed  Indications: Irritable Bowel Syndrome  
  
 simvastatin 80 mg tablet Commonly known as:  ZOCOR  
take 1 tablet by mouth every evening VASOTEC 10 mg tablet Generic drug:  enalapril Take  by mouth two (2) times a day. Follow-up Instructions Return in about 3 months (around 8/4/2017), or if symptoms worsen or fail to improve, for HTN, anxiety, LLQ abd pain. Patient Instructions Irritable Bowel Syndrome: Care Instructions Your Care Instructions Irritable bowel syndrome, or IBS, is a problem with the intestines that causes belly pain, bloating, gas, constipation, and diarrhea. The cause of IBS is not well known. IBS can last for many years, but it does not get worse over time or lead to serious disease. Most people can control their symptoms by changing their diet and reducing stress. Follow-up care is a key part of your treatment and safety. Be sure to make and go to all appointments, and call your doctor if you are having problems. It's also a good idea to know your test results and keep a list of the medicines you take. How can you care for yourself at home? · For constipation: 
¨ Include fruits, vegetables, beans, and whole grains in your diet each day. These foods are high in fiber. ¨ Drink plenty of fluids, enough so that your urine is light yellow or clear like water.  If you have kidney, heart, or liver disease and have to limit fluids, talk with your doctor before you increase the amount of fluids you drink. ¨ Get some exercise every day. Build up slowly to 30 to 60 minutes a day on 5 or more days of the week. ¨ Take a fiber supplement, such as Citrucel or Metamucil, every day if needed. Read and follow all instructions on the label. ¨ Schedule time each day for a bowel movement. Having a daily routine may help. Take your time and do not strain when having a bowel movement. · If you often have diarrhea, limit foods and drinks that make it worse. These are different for each person but may include caffeine (found in coffee, tea, chocolate, and cola drinks), alcohol, fatty foods, gas-producing foods (such as beans, cabbage, and broccoli), some dairy products, and spicy foods. Do not eat candy or gum that contains sorbitol. · Keep a daily diary of what you eat and what symptoms you have. This may help find foods that cause you problems. · Eat slowly. Try to make mealtime relaxing. · Find ways to reduce stress. · Get at least 30 minutes of exercise on most days of the week. Exercise can help reduce tension and prevent constipation. Walking is a good choice. You also may want to do other activities, such as running, swimming, cycling, or playing tennis or team sports. When should you call for help? Call your doctor now or seek immediate medical care if: 
· Your pain is different than usual or occurs with fever. · You lose weight without trying, or you lose your appetite and you do not know why. · Your symptoms often wake you from sleep. · Your stools are black and tarlike or have streaks of blood. Watch closely for changes in your health, and be sure to contact your doctor if: 
· Your IBS symptoms get worse or begin to disrupt your day-to-day life. · You become more tired than usual. 
· Your home treatment stops working. Where can you learn more? Go to http://kenneth-peter.info/. Enter I114 in the search box to learn more about \"Irritable Bowel Syndrome: Care Instructions. \" Current as of: August 9, 2016 Content Version: 11.2 © 4439-2734 [employer]. Care instructions adapted under license by [employer] (which disclaims liability or warranty for this information). If you have questions about a medical condition or this instruction, always ask your healthcare professional. [address] any warranty or liability for your use of this information. Introducing [facility]! Cristinasarah Luana introduces MediTAP patient portal. Now you can access parts of your medical record, email your doctor's office, and request medication refills online. 1. In your internet browser, go to [email or web address] 2. Click on the First Time User? Click Here link in the Sign In box. You will see the New Member Sign Up page. 3. Enter your MediTAP Access Code exactly as it appears below. You will not need to use this code after youve completed the sign-up process. If you do not sign up before the expiration date, you must request a new code. · MediTAP Access Code: [email or web address] Expires: 7/3/2017 11:16 AM 
 
4. Enter the last four digits of your Social Security Number (xxxx) and Date of Birth (mm/dd/yyyy) as indicated and click Submit. You will be taken to the next sign-up page. 5. Create a MediTAP ID. This will be your MediTAP login ID and cannot be changed, so think of one that is secure and easy to remember. 6. Create a MediTAP password. You can change your password at any time. 7. Enter your Password Reset Question and Answer. This can be used at a later time if you forget your password. 8. Enter your e-mail address. You will receive e-mail notification when new information is available in [address] 9. Click Sign Up. You can now view and download portions of your medical record. 10. Click the Download Summary menu link to download a portable copy of your medical information. If you have questions, please visit the Frequently Asked Questions section of the [employer] website. Remember, [employer] is NOT to be used for urgent needs. For medical emergencies, dial 911. Now available from your iPhone and Android! Please provide this summary of care documentation to your next provider. Your primary care clinician is listed as Clarice Ramirez. If you have any questions after today's visit, please call [phone].

## 2017-05-04 NOTE — PATIENT INSTRUCTIONS
Irritable Bowel Syndrome: Care Instructions  Your Care Instructions  Irritable bowel syndrome, or IBS, is a problem with the intestines that causes belly pain, bloating, gas, constipation, and diarrhea. The cause of IBS is not well known. IBS can last for many years, but it does not get worse over time or lead to serious disease. Most people can control their symptoms by changing their diet and reducing stress. Follow-up care is a key part of your treatment and safety. Be sure to make and go to all appointments, and call your doctor if you are having problems. It's also a good idea to know your test results and keep a list of the medicines you take. How can you care for yourself at home? · For constipation:  ¨ Include fruits, vegetables, beans, and whole grains in your diet each day. These foods are high in fiber. ¨ Drink plenty of fluids, enough so that your urine is light yellow or clear like water. If you have kidney, heart, or liver disease and have to limit fluids, talk with your doctor before you increase the amount of fluids you drink. ¨ Get some exercise every day. Build up slowly to 30 to 60 minutes a day on 5 or more days of the week. ¨ Take a fiber supplement, such as Citrucel or Metamucil, every day if needed. Read and follow all instructions on the label. ¨ Schedule time each day for a bowel movement. Having a daily routine may help. Take your time and do not strain when having a bowel movement. · If you often have diarrhea, limit foods and drinks that make it worse. These are different for each person but may include caffeine (found in coffee, tea, chocolate, and cola drinks), alcohol, fatty foods, gas-producing foods (such as beans, cabbage, and broccoli), some dairy products, and spicy foods. Do not eat candy or gum that contains sorbitol. · Keep a daily diary of what you eat and what symptoms you have. This may help find foods that cause you problems. · Eat slowly.  Try to make mealtime relaxing. · Find ways to reduce stress. · Get at least 30 minutes of exercise on most days of the week. Exercise can help reduce tension and prevent constipation. Walking is a good choice. You also may want to do other activities, such as running, swimming, cycling, or playing tennis or team sports. When should you call for help? Call your doctor now or seek immediate medical care if:  · Your pain is different than usual or occurs with fever. · You lose weight without trying, or you lose your appetite and you do not know why. · Your symptoms often wake you from sleep. · Your stools are black and tarlike or have streaks of blood. Watch closely for changes in your health, and be sure to contact your doctor if:  · Your IBS symptoms get worse or begin to disrupt your day-to-day life. · You become more tired than usual.  · Your home treatment stops working. Where can you learn more? Go to http://kenneth-peter.info/. Enter A064 in the search box to learn more about \"Irritable Bowel Syndrome: Care Instructions. \"  Current as of: August 9, 2016  Content Version: 11.2  © 6253-2027 [employer]. Care instructions adapted under license by [employer] (which disclaims liability or warranty for this information). If you have questions about a medical condition or this instruction, always ask your healthcare professional. [address] any warranty or liability for your use of this information.

## 2017-05-05 DIAGNOSIS — I10 ESSENTIAL HYPERTENSION: ICD-10-CM

## 2017-05-05 RX ORDER — ENALAPRIL MALEATE 10 MG/1
TABLET ORAL
Qty: 45 TAB | Refills: 5 | Status: SHIPPED | OUTPATIENT
Start: 2017-05-05 | End: 2017-08-04 | Stop reason: ALTCHOICE

## 2017-08-04 ENCOUNTER — OFFICE VISIT (OUTPATIENT)
Dept: INTERNAL MEDICINE CLINIC | Age: 52
End: 2017-08-04

## 2017-08-04 VITALS
HEART RATE: 60 BPM | DIASTOLIC BLOOD PRESSURE: 87 MMHG | WEIGHT: 188 LBS | RESPIRATION RATE: 16 BRPM | OXYGEN SATURATION: 98 % | TEMPERATURE: 98.6 F | BODY MASS INDEX: 24.92 KG/M2 | HEIGHT: 73 IN | SYSTOLIC BLOOD PRESSURE: 127 MMHG

## 2017-08-04 DIAGNOSIS — F41.1 GENERALIZED ANXIETY DISORDER: ICD-10-CM

## 2017-08-04 DIAGNOSIS — I10 ESSENTIAL HYPERTENSION: Primary | ICD-10-CM

## 2017-08-04 DIAGNOSIS — K58.0 IRRITABLE BOWEL SYNDROME WITH DIARRHEA: ICD-10-CM

## 2017-08-04 DIAGNOSIS — Z13.1 SCREENING FOR DIABETES MELLITUS: ICD-10-CM

## 2017-08-04 DIAGNOSIS — Z12.5 SCREENING FOR PROSTATE CANCER: ICD-10-CM

## 2017-08-04 DIAGNOSIS — E78.2 MIXED HYPERLIPIDEMIA: ICD-10-CM

## 2017-08-04 RX ORDER — ENALAPRIL MALEATE 10 MG/1
10 TABLET ORAL 2 TIMES DAILY
COMMUNITY
End: 2017-10-07 | Stop reason: SDUPTHER

## 2017-08-04 RX ORDER — BUSPIRONE HYDROCHLORIDE 15 MG/1
7.5 TABLET ORAL DAILY
COMMUNITY
End: 2017-11-01 | Stop reason: SDUPTHER

## 2017-08-04 NOTE — PATIENT INSTRUCTIONS

## 2017-08-04 NOTE — PROGRESS NOTES
Reviewed record  In preparation for visit and have obtained necessary documentation. 1. Have you been to the ER, urgent care clinic since your last visit? Hospitalized since your last visit?no  2. Have you seen or consulted any other health care providers outside of the [address] [place] [address] since your last visit? Include any pap smears or colon screening. no  Advanced directives: Patient declines information on advanced directives. Patients vital signs discussed with physician.

## 2017-08-04 NOTE — MR AVS SNAPSHOT
Visit Information Date & Time Provider Department Dept. Phone Encounter #  
 8/4/2017  7:45 AM Jass Dumont MD Keron Jack [phone] [id number] Follow-up Instructions Return in about 6 months (around 2/4/2018), or if symptoms worsen or fail to improve, for HTN, hyperlipidemia; schedule for fasting labs 1 week prior to appt. Upcoming Health Maintenance Date Due INFLUENZA AGE 9 TO ADULT 8/1/2017 COLONOSCOPY 8/18/2024 DTaP/Tdap/Td series (2 - Td) 9/21/2025 Allergies as of 8/4/2017  Review Complete On: 8/4/2017 By: Jass Dumont MD  
  
 Severity Noted Reaction Type Reactions Zoloft [Sertraline]  06/17/2015    Other (comments) Abdominal pain, nausea Current Immunizations  Reviewed on 9/28/2016 Name Date Hep B Vaccine (Adult) 7/13/2016, 4/18/2016, 3/15/2016 Influenza Vaccine Candiss Janet) 9/27/2016, 10/9/2015 Tdap 9/21/2015 Not reviewed this visit You Were Diagnosed With   
  
 Codes Comments Essential hypertension    -  Primary ICD-10-CM: I10 
ICD-9-CM: 401.9 Mixed hyperlipidemia     ICD-10-CM: E78.2 ICD-9-CM: 272.2 Irritable bowel syndrome with diarrhea     ICD-10-CM: K58.0 ICD-9-CM: 784.2 Generalized anxiety disorder     ICD-10-CM: F41.1 ICD-9-CM: 300.02 Vitals BP Pulse Temp Resp Height(growth percentile) Weight(growth percentile) 127/87 (BP 1 Location: Left arm, BP Patient Position: Sitting) 60 98.6 °F (37 °C) (Oral) 16 6' 1\" (1.854 m) 188 lb (85.3 kg) SpO2 BMI Smoking Status 98% 24.8 kg/m2 Never Smoker BMI and BSA Data Body Mass Index Body Surface Area  
 24.8 kg/m 2 2.1 m 2 Preferred Pharmacy Pharmacy Name Phone [facility] [address], [address] [phone] Your Updated Medication List  
  
   
This list is accurate as of: 8/4/17  8:18 AM.  Always use your most recent med list.  
  
  
  
  
 busPIRone 15 mg tablet Commonly known as:  BUSPAR Take 7.5 mg by mouth daily. hyoscyamine sulfate 0.125 mg tablet Commonly known as:  CYSTOSPAZ  
take 1 tablet by mouth every 4 hours if needed  Indications: Irritable Bowel Syndrome  
  
 simvastatin 80 mg tablet Commonly known as:  ZOCOR  
take 1 tablet by mouth every evening VASOTEC 10 mg tablet Generic drug:  enalapril Take 10 mg by mouth two (2) times a day. We Performed the Following LIPID PANEL [75403 CPT(R)] METABOLIC PANEL, COMPREHENSIVE [26937 CPT(R)] Follow-up Instructions Return in about 6 months (around 2/4/2018), or if symptoms worsen or fail to improve, for HTN, hyperlipidemia; schedule for fasting labs 1 week prior to appt. Patient Instructions Well Visit, Men 48 to 72: Care Instructions Your Care Instructions Physical exams can help you stay healthy. Your doctor has checked your overall health and may have suggested ways to take good care of yourself. He or she also may have recommended tests. At home, you can help prevent illness with healthy eating, regular exercise, and other steps. Follow-up care is a key part of your treatment and safety. Be sure to make and go to all appointments, and call your doctor if you are having problems. It's also a good idea to know your test results and keep a list of the medicines you take. How can you care for yourself at home? · Reach and stay at a healthy weight. This will lower your risk for many problems, such as obesity, diabetes, heart disease, and high blood pressure. · Get at least 30 minutes of exercise on most days of the week. Walking is a good choice. You also may want to do other activities, such as running, swimming, cycling, or playing tennis or team sports. · Do not smoke. Smoking can make health problems worse. If you need help quitting, talk to your doctor about stop-smoking programs and medicines. These can increase your chances of quitting for good. · Protect your skin from too much sun. When you're outdoors from 10 a.m. to 4 p.m., stay in the shade or cover up with clothing and a hat with a wide brim. Wear sunglasses that block UV rays. Even when it's cloudy, put broad-spectrum sunscreen (SPF 30 or higher) on any exposed skin. · See a dentist one or two times a year for checkups and to have your teeth cleaned. · Wear a seat belt in the car. · Limit alcohol to 2 drinks a day. Too much alcohol can cause health problems. Follow your doctor's advice about when to have certain tests. These tests can spot problems early. · Cholesterol. Your doctor will tell you how often to have this done based on your overall health and other things that can increase your risk for heart attack and stroke. · Blood pressure. Have your blood pressure checked during a routine doctor visit. Your doctor will tell you how often to check your blood pressure based on your age, your blood pressure results, and other factors. · Prostate exam. Talk to your doctor about whether you should have a blood test (called a PSA test) for prostate cancer. Experts disagree on whether men should have this test. Some experts recommend that you discuss the benefits and risks of the test with your doctor. · Diabetes. Ask your doctor whether you should have tests for diabetes. · Vision. Some experts recommend that you have yearly exams for glaucoma and other age-related eye problems starting at age 48. · Hearing. Tell your doctor if you notice any change in your hearing. You can have tests to find out how well you hear. · Colon cancer. You should begin tests for colon cancer at age 48. You may have one of several tests. Your doctor will tell you how often to have tests based on your age and risk.  Risks include whether you already had a precancerous polyp removed from your colon or whether your parent, brother, sister, or child has had colon cancer. · Heart attack and stroke risk. At least every 4 to 6 years, you should have your risk for heart attack and stroke assessed. Your doctor uses factors such as your age, blood pressure, cholesterol, and whether you smoke or have diabetes to show what your risk for a heart attack or stroke is over the next 10 years. · Abdominal aortic aneurysm. Ask your doctor whether you should have a test to check for an aneurysm. You may need a test if you ever smoked or if your parent, brother, sister, or child has had an aneurysm. When should you call for help? Watch closely for changes in your health, and be sure to contact your doctor if you have any problems or symptoms that concern you. Where can you learn more? Go to http://kenneth-peter.info/. Enter L137 in the search box to learn more about \"Well Visit, Men 48 to 72: Care Instructions. \" Current as of: July 19, 2016 Content Version: 11.3 © 2718-8525 nanoRETE. Care instructions adapted under license by [employer] (which disclaims liability or warranty for this information). If you have questions about a medical condition or this instruction, always ask your healthcare professional. [place] [address] any warranty or liability for your use of this information. Introducing [facility]! Jairo Carmona introduces [employer] patient portal. Now you can access parts of your medical record, email your doctor's office, and request medication refills online. 1. In your internet browser, go to [email or web address] 2. Click on the First Time User? Click Here link in the Sign In box. You will see the New Member Sign Up page. 3. Enter your [employer] Access Code exactly as it appears below. You will not need to use this code after youve completed the sign-up process.  If you do not sign up before the expiration date, you must request a new code. 
 
· [employer] [place] Access Code: [email or web address] Expires: 11/2/2017  8:18 AM 
 
4. Enter the last four digits of your Social Security Number (xxxx) and Date of Birth (mm/dd/yyyy) as indicated and click Submit. You will be taken to the next sign-up page. 5. Create a [employer] [place] ID. This will be your [employer] [place] login ID and cannot be changed, so think of one that is secure and easy to remember. 6. Create a [employer] [place] password. You can change your password at any time. 7. Enter your Password Reset Question and Answer. This can be used at a later time if you forget your password. 8. Enter your e-mail address. You will receive e-mail notification when new information is available in [address] 9. Click Sign Up. You can now view and download portions of your medical record. 10. Click the Download Summary menu link to download a portable copy of your medical information. If you have questions, please visit the Frequently Asked Questions section of the [employer] [place] website. Remember, [employer] [place] is NOT to be used for urgent needs. For medical emergencies, dial 911. Now available from your iPhone and Android! Please provide this summary of care documentation to your next provider. Your primary care clinician is listed as Donald Flor. If you have any questions after today's visit, please call [phone].

## 2017-08-04 NOTE — PROGRESS NOTES
CC:   Chief Complaint   Patient presents with    Hypertension    Anxiety       HISTORY OF PRESENT ILLNESS  Kendra Mehta is a 47 y.o. male. Presents for 3 month follow up evaluation. He has HTN, hyperlipidemia, irritable bowel syndrome (IBS) with abd pain, depression, and anxiety disorder. He has no complaints today. Takes occasional Mylanta and Bitter's drops in ginger ale for abdominal pain and nausea. Feels buspirone 15 mg 1/2 tab 1-2 times daily is helping his anxiety (not taking 1 tab twice daily as instructed at last clinic visit). Cardiovascular Review  The patient has hypertension and hyperlipidemia. He reports taking medications as instructed, no medication side effects noted. He increased his enalapril 10 mg to twice daily because home SBP's in 140's; doing better now. Diet and Lifestyle: generally follows a low fat low cholesterol diet, generally follows a low sodium diet, exercises regularly. Lab review: orders written for new lab studies as appropriate; see orders. Soc Hx  . Has 2 daughters (oldest in college at [employer] is pre-med; youngest daughter wants to be a dentist). Works in [employer] for [employer]; works from home. Never smoker. Drinks 1-2 beers a month (previously, 3-4 beers a day on weekends). Denies recreational drug use.  Walks for exercise.      Health Maintenance  Td: 9/21/15  Eye exam: 2015 (Dr. Solo Grimes); was told he had early glaucoma  Colonoscopy: 8/18/2014 (Dr. Dimitrios James); normal, repeat in 10 yrs (2024)      ROS  Constitutional: negative for fevers, chills, night sweats  ENT:   negative for sore throat, nasal congestion, ear pains, hoarseness  Respiratory:  negative for cough, hemoptysis, dyspnea, wheezing  CV:   negative for chest pain, palpitations, lower extremity edema  GI:   negative for heartburn, nausea, vomiting, diarrhea, constipation  Genitourinary: negative for frequency, dysuria and hematuria; positive for weak urinary stream  Integument:  negative for rash and pruritus  Musculoskel: negative for myalgias, arthralgias, back pain, muscle weakness, joint pain  Neurological:  negative for headaches, dizziness, vertigo, gait problems  Behavl/Psych: negative for feelings of anxiety, depression, mood change     Patient Active Problem List   Diagnosis Code    IBS (irritable bowel syndrome) K58.9    Essential hypertension I10    Hyperlipidemia E78.5    Depression with anxiety F41.8    Nephrolithiasis N20.0     Past Medical History:   Diagnosis Date    ADD (attention deficit disorder)     Cholelithiasis 9/29/14    CT in 8/14. Saw Dr. Patria Gilford; asymptomatic gallstones    GERD (gastroesophageal reflux disease)     possibly    Hyperlipidemia     high cholesterol    Hypertension     Nephrolithiasis     KIDNEY STONES    Other recurrent depressive disorders (HCC)     generalized anxiety disorder     Allergies   Allergen Reactions    Zoloft [Sertraline] Other (comments)     Abdominal pain, nausea     Current Outpatient Prescriptions   Medication Sig Dispense Refill    enalapril (VASOTEC) 10 mg tablet Take 10 mg by mouth two (2) times a day.  busPIRone (BUSPAR) 15 mg tablet Take 7.5 mg by mouth daily.  simvastatin (ZOCOR) 80 mg tablet take 1 tablet by mouth every evening 30 Tab 5    hyoscyamine sulfate (CYSTOSPAZ) 0.125 mg tablet take 1 tablet by mouth every 4 hours if needed  Indications: Irritable Bowel Syndrome 48 Tab 2         PHYSICAL EXAM  Visit Vitals    /87 (BP 1 Location: Left arm, BP Patient Position: Sitting)    Pulse 60    Temp 98.6 °F (37 °C) (Oral)    Resp 16    Ht 6' 1\" (1.854 m)    Wt 188 lb (85.3 kg)    SpO2 98%    BMI 24.8 kg/m2       General: Well-developed and well-nourished, no distress. HEENT:  Head normocephalic/atraumatic, no scleral icterus  Neck: Supple. No carotid bruits, JVD, lymphadenopathy, or thyromegaly. Lungs:  Clear to ausculation bilaterally. Good air movement.   Heart:  Regular rate and rhythm, normal S1 and S2, no murmur, gallop, or rub  Abdomen: Soft, non-distended, normal bowel sounds, tenderness at epigastrium, no guarding, masses, rebound tenderness, or HSM. Extremities: No clubbing, cyanosis, or edema. Neurological: Alert and oriented. Psychiatric: Normal mood and affect. Behavior is normal.       ASSESSMENT AND PLAN    ICD-10-CM ICD-9-CM    1. Essential hypertension B52 088.2 METABOLIC PANEL, COMPREHENSIVE      METABOLIC PANEL, COMPREHENSIVE      CBC WITH AUTOMATED DIFF   2. Mixed hyperlipidemia E78.2 272.2 LIPID PANEL      LIPID PANEL      TSH RFX ON ABNORMAL TO FREE T4   3. Irritable bowel syndrome with diarrhea K58.0 564.1    4. Generalized anxiety disorder F41.1 300.02    5. Screening for diabetes mellitus Z13.1 V77.1 HEMOGLOBIN A1C WITH EAG   6. Screening for prostate cancer Z12.5 V76.44 PSA SCREENING (SCREENING)       Diagnoses and all orders for this visit:    1. Essential hypertension  Controlled, continue present management.  -     METABOLIC PANEL, COMPREHENSIVE  -     METABOLIC PANEL, COMPREHENSIVE; Future  -     CBC WITH AUTOMATED DIFF; Future    2. Mixed hyperlipidemia  TG a little high when last checked in 1/17, but total chol normal. Will recheck today. -     LIPID PANEL  -     LIPID PANEL; Future  -     TSH RFX ON ABNORMAL TO FREE T4; Future    3. Irritable bowel syndrome with diarrhea  Controlled. Continue present management. 4. Generalized anxiety disorder  Controlled on buspirone; okay to continue on lower dose if it is helping him. 5. Screening for diabetes mellitus  -     HEMOGLOBIN A1C WITH EAG; Future    6. Screening for prostate cancer  -     PSA SCREENING (SCREENING); Future      Follow-up Disposition:  Return in about 6 months (around 2/4/2018), or if symptoms worsen or fail to improve, for HTN, hyperlipidemia; schedule for fasting labs 1 week prior to appt. Provided patient and/or family with advanced directive information and answered pertinent questions.   Encouraged patient to provide a copy of advanced directive to the office when available. I have discussed the diagnosis with the patient and the intended plan as seen in the above orders. Patient is in agreement. The patient has received an after-visit summary and questions were answered concerning future plans. I have discussed medication side effects and warnings with the patient as well.

## 2017-08-05 LAB
ALBUMIN SERPL-MCNC: 4.8 G/DL (ref 3.5–5.5)
ALBUMIN/GLOB SERPL: 1.9 {RATIO} (ref 1.2–2.2)
ALP SERPL-CCNC: 91 IU/L (ref 39–117)
ALT SERPL-CCNC: 18 IU/L (ref 0–44)
AST SERPL-CCNC: 16 IU/L (ref 0–40)
BILIRUB SERPL-MCNC: 0.5 MG/DL (ref 0–1.2)
BUN SERPL-MCNC: 10 MG/DL (ref 6–24)
BUN/CREAT SERPL: 10 (ref 9–20)
CALCIUM SERPL-MCNC: 9.8 MG/DL (ref 8.7–10.2)
CHLORIDE SERPL-SCNC: 96 MMOL/L (ref 96–106)
CHOLEST SERPL-MCNC: 177 MG/DL (ref 100–199)
CO2 SERPL-SCNC: 27 MMOL/L (ref 18–29)
CREAT SERPL-MCNC: 0.98 MG/DL (ref 0.76–1.27)
GLOBULIN SER CALC-MCNC: 2.5 G/DL (ref 1.5–4.5)
GLUCOSE SERPL-MCNC: 89 MG/DL (ref 65–99)
HDLC SERPL-MCNC: 53 MG/DL
INTERPRETATION, 910389: NORMAL
LDLC SERPL CALC-MCNC: 86 MG/DL (ref 0–99)
POTASSIUM SERPL-SCNC: 4.6 MMOL/L (ref 3.5–5.2)
PROT SERPL-MCNC: 7.3 G/DL (ref 6–8.5)
SODIUM SERPL-SCNC: 139 MMOL/L (ref 134–144)
TRIGL SERPL-MCNC: 192 MG/DL (ref 0–149)
VLDLC SERPL CALC-MCNC: 38 MG/DL (ref 5–40)

## 2017-08-08 NOTE — PROGRESS NOTES
Your recent labs showed normal kidney and liver tests and normal cholesterol. Keep up the good work!

## 2017-10-06 DIAGNOSIS — I10 ESSENTIAL HYPERTENSION: ICD-10-CM

## 2017-10-07 RX ORDER — ENALAPRIL MALEATE 10 MG/1
TABLET ORAL
Qty: 45 TAB | Refills: 5 | Status: SHIPPED | OUTPATIENT
Start: 2017-10-07 | End: 2018-02-20 | Stop reason: ALTCHOICE

## 2017-11-01 DIAGNOSIS — F41.9 ANXIETY: ICD-10-CM

## 2017-11-01 RX ORDER — BUSPIRONE HYDROCHLORIDE 15 MG/1
TABLET ORAL
Qty: 30 TAB | Refills: 3 | Status: SHIPPED | OUTPATIENT
Start: 2017-11-01 | End: 2018-02-20 | Stop reason: ALTCHOICE

## 2018-02-13 ENCOUNTER — APPOINTMENT (OUTPATIENT)
Dept: INTERNAL MEDICINE CLINIC | Age: 53
End: 2018-02-13

## 2018-02-13 DIAGNOSIS — Z12.5 SCREENING FOR PROSTATE CANCER: ICD-10-CM

## 2018-02-13 DIAGNOSIS — I10 ESSENTIAL HYPERTENSION: ICD-10-CM

## 2018-02-13 DIAGNOSIS — E78.2 MIXED HYPERLIPIDEMIA: ICD-10-CM

## 2018-02-13 DIAGNOSIS — Z13.1 SCREENING FOR DIABETES MELLITUS: ICD-10-CM

## 2018-02-14 LAB
ALBUMIN SERPL-MCNC: 4.8 G/DL (ref 3.5–5.5)
ALBUMIN/GLOB SERPL: 2 {RATIO} (ref 1.2–2.2)
ALP SERPL-CCNC: 84 IU/L (ref 39–117)
ALT SERPL-CCNC: 23 IU/L (ref 0–44)
AST SERPL-CCNC: 20 IU/L (ref 0–40)
BASOPHILS # BLD AUTO: 0 X10E3/UL (ref 0–0.2)
BASOPHILS NFR BLD AUTO: 0 %
BILIRUB SERPL-MCNC: 0.7 MG/DL (ref 0–1.2)
BUN SERPL-MCNC: 11 MG/DL (ref 6–24)
BUN/CREAT SERPL: 10 (ref 9–20)
CALCIUM SERPL-MCNC: 9.8 MG/DL (ref 8.7–10.2)
CHLORIDE SERPL-SCNC: 99 MMOL/L (ref 96–106)
CHOLEST SERPL-MCNC: 248 MG/DL (ref 100–199)
CO2 SERPL-SCNC: 27 MMOL/L (ref 18–29)
CREAT SERPL-MCNC: 1.05 MG/DL (ref 0.76–1.27)
EOSINOPHIL # BLD AUTO: 0.1 X10E3/UL (ref 0–0.4)
EOSINOPHIL NFR BLD AUTO: 1 %
ERYTHROCYTE [DISTWIDTH] IN BLOOD BY AUTOMATED COUNT: 14.6 % (ref 12.3–15.4)
EST. AVERAGE GLUCOSE BLD GHB EST-MCNC: 100 MG/DL
GFR SERPLBLD CREATININE-BSD FMLA CKD-EPI: 82 ML/MIN/1.73
GFR SERPLBLD CREATININE-BSD FMLA CKD-EPI: 95 ML/MIN/1.73
GLOBULIN SER CALC-MCNC: 2.4 G/DL (ref 1.5–4.5)
GLUCOSE SERPL-MCNC: 89 MG/DL (ref 65–99)
HBA1C MFR BLD: 5.1 % (ref 4.8–5.6)
HCT VFR BLD AUTO: 42.5 % (ref 37.5–51)
HDLC SERPL-MCNC: 46 MG/DL
HGB BLD-MCNC: 14.8 G/DL (ref 13–17.7)
IMM GRANULOCYTES # BLD: 0 X10E3/UL (ref 0–0.1)
IMM GRANULOCYTES NFR BLD: 0 %
INTERPRETATION, 910389: NORMAL
LDLC SERPL CALC-MCNC: 165 MG/DL (ref 0–99)
LYMPHOCYTES # BLD AUTO: 1.6 X10E3/UL (ref 0.7–3.1)
LYMPHOCYTES NFR BLD AUTO: 32 %
MCH RBC QN AUTO: 30.3 PG (ref 26.6–33)
MCHC RBC AUTO-ENTMCNC: 34.8 G/DL (ref 31.5–35.7)
MCV RBC AUTO: 87 FL (ref 79–97)
MONOCYTES # BLD AUTO: 0.5 X10E3/UL (ref 0.1–0.9)
MONOCYTES NFR BLD AUTO: 11 %
NEUTROPHILS # BLD AUTO: 2.8 X10E3/UL (ref 1.4–7)
NEUTROPHILS NFR BLD AUTO: 56 %
PLATELET # BLD AUTO: 266 X10E3/UL (ref 150–379)
POTASSIUM SERPL-SCNC: 4.7 MMOL/L (ref 3.5–5.2)
PROT SERPL-MCNC: 7.2 G/DL (ref 6–8.5)
PSA SERPL-MCNC: 1.1 NG/ML (ref 0–4)
RBC # BLD AUTO: 4.88 X10E6/UL (ref 4.14–5.8)
SODIUM SERPL-SCNC: 142 MMOL/L (ref 134–144)
TRIGL SERPL-MCNC: 185 MG/DL (ref 0–149)
TSH SERPL DL<=0.005 MIU/L-ACNC: 3.11 UIU/ML (ref 0.45–4.5)
VLDLC SERPL CALC-MCNC: 37 MG/DL (ref 5–40)
WBC # BLD AUTO: 4.9 X10E3/UL (ref 3.4–10.8)

## 2018-02-20 ENCOUNTER — OFFICE VISIT (OUTPATIENT)
Dept: INTERNAL MEDICINE CLINIC | Age: 53
End: 2018-02-20

## 2018-02-20 VITALS
BODY MASS INDEX: 24.65 KG/M2 | HEART RATE: 60 BPM | SYSTOLIC BLOOD PRESSURE: 123 MMHG | RESPIRATION RATE: 16 BRPM | DIASTOLIC BLOOD PRESSURE: 78 MMHG | WEIGHT: 186 LBS | TEMPERATURE: 97.9 F | HEIGHT: 73 IN | OXYGEN SATURATION: 97 %

## 2018-02-20 DIAGNOSIS — I10 ESSENTIAL HYPERTENSION: Primary | ICD-10-CM

## 2018-02-20 DIAGNOSIS — E78.2 MIXED HYPERLIPIDEMIA: ICD-10-CM

## 2018-02-20 DIAGNOSIS — F41.1 GENERALIZED ANXIETY DISORDER: ICD-10-CM

## 2018-02-20 DIAGNOSIS — Z23 ENCOUNTER FOR IMMUNIZATION: ICD-10-CM

## 2018-02-20 DIAGNOSIS — K58.0 IRRITABLE BOWEL SYNDROME WITH DIARRHEA: ICD-10-CM

## 2018-02-20 RX ORDER — ENALAPRIL MALEATE 10 MG/1
20 TABLET ORAL DAILY
Qty: 180 TAB | Refills: 3 | Status: SHIPPED | OUTPATIENT
Start: 2018-02-20 | End: 2019-06-14 | Stop reason: SDUPTHER

## 2018-02-20 RX ORDER — BUSPIRONE HYDROCHLORIDE 15 MG/1
7.5 TABLET ORAL 2 TIMES DAILY
COMMUNITY
End: 2018-03-23 | Stop reason: DRUGHIGH

## 2018-02-20 RX ORDER — CITALOPRAM 10 MG/1
10 TABLET ORAL DAILY
Qty: 30 TAB | Refills: 3 | Status: SHIPPED | OUTPATIENT
Start: 2018-02-20 | End: 2019-06-19

## 2018-02-20 RX ORDER — SIMVASTATIN 80 MG/1
TABLET, FILM COATED ORAL
Qty: 90 TAB | Refills: 3 | Status: SHIPPED | OUTPATIENT
Start: 2018-02-20 | End: 2019-06-14 | Stop reason: SDUPTHER

## 2018-02-20 RX ORDER — ENALAPRIL MALEATE 10 MG/1
20 TABLET ORAL DAILY
COMMUNITY
End: 2018-02-20 | Stop reason: SDUPTHER

## 2018-02-20 NOTE — PATIENT INSTRUCTIONS
Statins: Care Instructions  Your Care Instructions    Statins are medicines that lower your cholesterol and your risk for a heart attack and stroke. Cholesterol is a type of fat in your blood. If you have too much cholesterol, it can build up in blood vessels. This raises your risk of heart disease, heart attack, and stroke. Statins lower cholesterol by blocking how much your body makes. This prevents cholesterol from building up in your blood vessels. This is called hardening of the arteries. It is the starting point for some heart and blood flow problems, such as heart disease. Statins may also reduce inflammation around the buildup (called plaque). This can lower the risk that the plaque will break apart and lead to a heart attack or stroke. A heart-healthy lifestyle is important for lowering your risk whether you take statins or not. This includes eating healthy foods, being active, staying at a healthy weight, and not smoking. You must take statins regularly for them to work well. If you stop, your cholesterol and your risk will go back up. Examples of statins include:  · Atorvastatin (Lipitor). · Lovastatin (Mevacor). · Pravastatin (Pravachol). · Simvastatin (Zocor). Statins interact with many medicines. So tell your doctor all of the other medicines that you take. These include prescription medicines, over-the-counter medicines, dietary supplements, and herbal products. Follow-up care is a key part of your treatment and safety. Be sure to make and go to all appointments, and call your doctor if you are having problems. It's also a good idea to know your test results and keep a list of the medicines you take. How can you care for yourself at home? · Take statins exactly as your doctor tells you. High cholesterol has no symptoms. So it is easy to forget to take the pills. Try to make a system that reminds you to take them.   · Do not take two or more medicines at the same time unless the doctor told you to. Statins can interact with other medicines. · Always tell your doctor if you think you are having a side effect. If side effects are a problem with one medicine, a different one may be used. · Keep making the lifestyle changes your doctor suggests. Eat heart-healthy foods, be active, don't smoke, and stay at a healthy weight. · Talk to your doctor about avoiding grapefruit juice if you take statins. Grapefruit juice can raise the level of this medicine in your blood. This could increase side effects. When should you call for help? Watch closely for changes in your health, and be sure to contact your doctor if:  ? · You think you are having problems with your medicine. ? · You have aches or muscle pain. Where can you learn more? Go to http://kenneth-peter.info/. Enter R358 in the search box to learn more about \"Statins: Care Instructions. \"  Current as of: September 21, 2016  Content Version: 11.4  © 3924-1845 [employer]. Care instructions adapted under license by [employer] (which disclaims liability or warranty for this information). If you have questions about a medical condition or this instruction, always ask your healthcare professional. [place] [address] any warranty or liability for your use of this information. Anxiety Disorder: Care Instructions  Your Care Instructions    Anxiety is a normal reaction to stress. Difficult situations can cause you to have symptoms such as sweaty palms and a nervous feeling. In an anxiety disorder, the symptoms are far more severe. Constant worry, muscle tension, trouble sleeping, nausea and diarrhea, and other symptoms can make normal daily activities difficult or impossible. These symptoms may occur for no reason, and they can affect your work, school, or social life. Medicines, counseling, and self-care can all help. Follow-up care is a key part of your treatment and safety.  Be sure to make and go to all appointments, and call your doctor if you are having problems. It's also a good idea to know your test results and keep a list of the medicines you take. How can you care for yourself at home? · Take medicines exactly as directed. Call your doctor if you think you are having a problem with your medicine. · Go to your counseling sessions and follow-up appointments. · Recognize and accept your anxiety. Then, when you are in a situation that makes you anxious, say to yourself, \"This is not an emergency. I feel uncomfortable, but I am not in danger. I can keep going even if I feel anxious. \"  · Be kind to your body:  ¨ Relieve tension with exercise or a massage. ¨ Get enough rest.  ¨ Avoid alcohol, caffeine, nicotine, and illegal drugs. They can increase your anxiety level and cause sleep problems. ¨ Learn and do relaxation techniques. See below for more about these techniques. · Engage your mind. Get out and do something you enjoy. Go to a funny movie, or take a walk or hike. Plan your day. Having too much or too little to do can make you anxious. · Keep a record of your symptoms. Discuss your fears with a good friend or family member, or join a support group for people with similar problems. Talking to others sometimes relieves stress. · Get involved in social groups, or volunteer to help others. Being alone sometimes makes things seem worse than they are. · Get at least 30 minutes of exercise on most days of the week to relieve stress. Walking is a good choice. You also may want to do other activities, such as running, swimming, cycling, or playing tennis or team sports. Relaxation techniques  Do relaxation exercises 10 to 20 minutes a day. You can play soothing, relaxing music while you do them, if you wish. · Tell others in your house that you are going to do your relaxation exercises. Ask them not to disturb you.   · Find a comfortable place, away from all distractions and noise.  · Lie down on your back, or sit with your back straight. · Focus on your breathing. Make it slow and steady. · Breathe in through your nose. Breathe out through either your nose or mouth. · Breathe deeply, filling up the area between your navel and your rib cage. Breathe so that your belly goes up and down. · Do not hold your breath. · Breathe like this for 5 to 10 minutes. Notice the feeling of calmness throughout your whole body. As you continue to breathe slowly and deeply, relax by doing the following for another 5 to 10 minutes:  · Tighten and relax each muscle group in your body. You can begin at your toes and work your way up to your head. · Imagine your muscle groups relaxing and becoming heavy. · Empty your mind of all thoughts. · Let yourself relax more and more deeply. · Become aware of the state of calmness that surrounds you. · When your relaxation time is over, you can bring yourself back to alertness by moving your fingers and toes and then your hands and feet and then stretching and moving your entire body. Sometimes people fall asleep during relaxation, but they usually wake up shortly afterward. · Always give yourself time to return to full alertness before you drive a car or do anything that might cause an accident if you are not fully alert. Never play a relaxation tape while you drive a car. When should you call for help? Call 911 anytime you think you may need emergency care. For example, call if:  ? · You feel you cannot stop from hurting yourself or someone else. ? Keep the numbers for these national suicide hotlines: 6-080-042-TALK (4-331.141.6690) and 5-322-CYLXSGX (4-215.364.9002). If you or someone you know talks about suicide or feeling hopeless, get help right away. ? Watch closely for changes in your health, and be sure to contact your doctor if:  ? · You have anxiety or fear that affects your life.    ? · You have symptoms of anxiety that are new or different from those you had before. Where can you learn more? Go to http://kenneth-peter.info/. Enter P754 in the search box to learn more about \"Anxiety Disorder: Care Instructions. \"  Current as of: May 12, 2017  Content Version: 11.4  © 1002-4277 [employer], [email or web address]. Care instructions adapted under license by [employer] (which disclaims liability or warranty for this information). If you have questions about a medical condition or this instruction, always ask your healthcare professional. [address] any warranty or liability for your use of this information.

## 2018-02-20 NOTE — PROGRESS NOTES
Yumiko Jackson  Identified pt with two pt identifiers(name and ). Chief Complaint   Patient presents with    Hypertension    Cholesterol Problem    Immunization/Injection       1. Have you been to the ER, urgent care clinic since your last visit? Hospitalized since your last visit? NO    2. Have you seen or consulted any other health care providers outside of the [address] [place] [address] since your last visit? Include any pap smears or colon screening. Saw dermatology Dr Dee Ford provider has been notified of reason for visit, vitals and flowsheets obtained on patients.      Patient received paperwork for advance directive during previous visit but has not completed at this time     Reviewed record In preparation for visit, huddled with provider and have obtained necessary documentation      Health Maintenance Due   Topic    Influenza Age 5 to Adult        Wt Readings from Last 3 Encounters:   18 186 lb (84.4 kg)   17 188 lb (85.3 kg)   17 187 lb (84.8 kg)     Temp Readings from Last 3 Encounters:   18 97.9 °F (36.6 °C) (Oral)   17 98.6 °F (37 °C) (Oral)   17 98 °F (36.7 °C) (Oral)     BP Readings from Last 3 Encounters:   18 123/78   17 127/87   17 122/80     Pulse Readings from Last 3 Encounters:   18 60   17 60   17 63     Vitals:    18 1259   BP: 123/78   Pulse: 60   Resp: 16   Temp: 97.9 °F (36.6 °C)   TempSrc: Oral   SpO2: 97%   Weight: 186 lb (84.4 kg)   Height: 6' 1\" (1.854 m)   PainSc:   0 - No pain         Learning Assessment:  :     Learning Assessment 2015   PRIMARY LEARNER Patient Patient   HIGHEST LEVEL OF EDUCATION - PRIMARY LEARNER  4 YEARS OF COLLEGE 4 YEARS OF COLLEGE   BARRIERS PRIMARY LEARNER NONE NONE   CO-LEARNER CAREGIVER No No   PRIMARY LANGUAGE ENGLISH ENGLISH   LEARNER PREFERENCE PRIMARY LISTENING DEMONSTRATION   ANSWERED BY patients Patient   RELATIONSHIP SELF SELF       Depression Screening:  :     PHQ over the last two weeks 6/16/2015   Little interest or pleasure in doing things Not at all   Feeling down, depressed or hopeless Not at all   Total Score PHQ 2 0       Fall Risk Assessment:  :     No flowsheet data found. Abuse Screening:  :     No flowsheet data found. ADL Screening:  :     ADL Assessment 6/16/2015   Feeding yourself No Help Needed   Getting from bed to chair No Help Needed   Getting dressed No Help Needed   Bathing or showering No Help Needed   Walk across the room (includes cane/walker) No Help Needed   Using the telphone No Help Needed   Taking your medications No Help Needed   Preparing meals No Help Needed   Managing money (expenses/bills) No Help Needed   Moderately strenuous housework (laundry) No Help Needed   Shopping for personal items (toiletries/medicines) No Help Needed   Shopping for groceries No Help Needed   Driving No Help Needed   Climbing a flight of stairs No Help Needed   Getting to places beyond walking distances No Help Needed         Per Dr. Nigel Murdock verbal order read back orders placed for flu vaccine. Influenza vaccine 0.5 ml given right deltoid IM. [address]  # [id number] Lot # [id number]   Exp. Date 6/30/18 VIS given. 5 [facility]  Patient tolerated injection with no complications. Medication reconciliation up to date and corrected with patient at this time.

## 2018-02-20 NOTE — MR AVS SNAPSHOT
[address] [place] [address] [address] [address] [phone] Patient: Eusebio Navarro MRN: [id number] :[id number] Visit Information Date & Time Provider Department Dept. Phone Encounter #  
 2018  1:00 PM [facility] [phone] [id number] Follow-up Instructions Return in about 3 months (around 2018), or if symptoms worsen or fail to improve, for Anxiety follow up. Upcoming Health Maintenance Date Due COLONOSCOPY 2024 DTaP/Tdap/Td series (2 - Td) 2025 Allergies as of 2018  Review Complete On: 2018 By: Lady Rajni MD  
  
 Severity Noted Reaction Type Reactions Zoloft [Sertraline]  2015    Other (comments) Abdominal pain, nausea Current Immunizations  Reviewed on 2016 Name Date Hep B Vaccine (Adult) 2016, 2016, 3/15/2016 Influenza Vaccine Clydie Bulla) 2016, 10/9/2015 Influenza Vaccine (Quad) PF  Incomplete Tdap 2015 Not reviewed this visit You Were Diagnosed With   
  
 Codes Comments Essential hypertension    -  Primary ICD-10-CM: I10 
ICD-9-CM: 401.9 Generalized anxiety disorder     ICD-10-CM: F41.1 ICD-9-CM: 300.02 Mixed hyperlipidemia     ICD-10-CM: E78.2 ICD-9-CM: 272.2 Irritable bowel syndrome with diarrhea     ICD-10-CM: K58.0 ICD-9-CM: 460.1 Encounter for immunization     ICD-10-CM: I27 ICD-9-CM: V03.89 Vitals BP Pulse Temp Resp Height(growth percentile) Weight(growth percentile) 123/78 (BP 1 Location: Left arm, BP Patient Position: Sitting) 60 97.9 °F (36.6 °C) (Oral) 16 6' 1\" (1.854 m) 186 lb (84.4 kg) SpO2 BMI Smoking Status 97% 24.54 kg/m2 Never Smoker BMI and BSA Data Body Mass Index Body Surface Area 24.54 kg/m 2 2.08 m 2 Preferred Pharmacy Pharmacy Name Phone [id number], [address] [phone] Your Updated Medication List  
  
   
This list is accurate as of: 2/20/18  1:35 PM.  Always use your most recent med list.  
  
  
  
  
 busPIRone 15 mg tablet Commonly known as:  BUSPAR Take 7.5 mg by mouth two (2) times a day. enalapril 10 mg tablet Commonly known as:  Brian [place] Take 2 Tabs by mouth daily. Indications: hypertension FISH OIL PO Take 2,000 mg by mouth daily. hyoscyamine sulfate 0.125 mg tablet Commonly known as:  CYSTOSPAZ  
take 1 tablet by mouth every 4 hours if needed  Indications: Irritable Bowel Syndrome OTHER Tumeric Curcumin supplement 500 mg  
  
 simvastatin 80 mg tablet Commonly known as:  ZOCOR  
take 1 tablet by mouth every evening  Indications: hyperlipidemia Prescriptions Sent to Pharmacy Refills  
 enalapril (VASOTEC) 10 mg tablet 3 Sig: Take 2 Tabs by mouth daily. Indications: hypertension Class: Normal  
 Pharmacy: [address] Ph #: [phone] Route: Oral  
 simvastatin (ZOCOR) 80 mg tablet 3 Sig: take 1 tablet by mouth every evening  Indications: hyperlipidemia Class: Normal  
 Pharmacy: [address] Ph #: [phone] We Performed the Following INFLUENZA VIRUS VAC QUAD,SPLIT,PRESV FREE SYRINGE IM [id number] CPT(R)] [place] IMMUNIZ ADMIN,1 SINGLE/COMB VAC/TOXOID [id number] CPT(R)] Follow-up Instructions Return in about 3 months (around 5/20/2018), or if symptoms worsen or fail to improve, for Anxiety follow up. Patient Instructions Statins: Care Instructions Your Care Instructions Statins are medicines that lower your cholesterol and your risk for a heart attack and stroke. Cholesterol is a type of fat in your blood.  If you have too much cholesterol, it can build up in blood vessels. This raises your risk of heart disease, heart attack, and stroke. Statins lower cholesterol by blocking how much your body makes. This prevents cholesterol from building up in your blood vessels. This is called hardening of the arteries. It is the starting point for some heart and blood flow problems, such as heart disease. Statins may also reduce inflammation around the buildup (called plaque). This can lower the risk that the plaque will break apart and lead to a heart attack or stroke. A heart-healthy lifestyle is important for lowering your risk whether you take statins or not. This includes eating healthy foods, being active, staying at a healthy weight, and not smoking. You must take statins regularly for them to work well. If you stop, your cholesterol and your risk will go back up. Examples of statins include: · Atorvastatin (Lipitor). · Lovastatin (Mevacor). · Pravastatin (Pravachol). · Simvastatin (Zocor). Statins interact with many medicines. So tell your doctor all of the other medicines that you take. These include prescription medicines, over-the-counter medicines, dietary supplements, and herbal products. Follow-up care is a key part of your treatment and safety. Be sure to make and go to all appointments, and call your doctor if you are having problems. It's also a good idea to know your test results and keep a list of the medicines you take. How can you care for yourself at home? · Take statins exactly as your doctor tells you. High cholesterol has no symptoms. So it is easy to forget to take the pills. Try to make a system that reminds you to take them. · Do not take two or more medicines at the same time unless the doctor told you to. Statins can interact with other medicines. · Always tell your doctor if you think you are having a side effect. If side effects are a problem with one medicine, a different one may be used. · Keep making the lifestyle changes your doctor suggests. Eat heart-healthy foods, be active, don't smoke, and stay at a healthy weight. · Talk to your doctor about avoiding grapefruit juice if you take statins. Grapefruit juice can raise the level of this medicine in your blood. This could increase side effects. When should you call for help? Watch closely for changes in your health, and be sure to contact your doctor if: 
? · You think you are having problems with your medicine. ? · You have aches or muscle pain. Where can you learn more? Go to http://kennethVillijpeter.info/. Enter R358 in the search box to learn more about \"Statins: Care Instructions. \" Current as of: September 21, 2016 Content Version: 11.4 © 0899-5848 [employer]. Care instructions adapted under license by [employer] (which disclaims liability or warranty for this information). If you have questions about a medical condition or this instruction, always ask your healthcare professional. [place] [address] any warranty or liability for your use of this information. Anxiety Disorder: Care Instructions Your Care Instructions Anxiety is a normal reaction to stress. Difficult situations can cause you to have symptoms such as sweaty palms and a nervous feeling. In an anxiety disorder, the symptoms are far more severe. Constant worry, muscle tension, trouble sleeping, nausea and diarrhea, and other symptoms can make normal daily activities difficult or impossible. These symptoms may occur for no reason, and they can affect your work, school, or social life. Medicines, counseling, and self-care can all help. Follow-up care is a key part of your treatment and safety. Be sure to make and go to all appointments, and call your doctor if you are having problems. It's also a good idea to know your test results and keep a list of the medicines you take. How can you care for yourself at home? · Take medicines exactly as directed. Call your doctor if you think you are having a problem with your medicine. · Go to your counseling sessions and follow-up appointments. · Recognize and accept your anxiety. Then, when you are in a situation that makes you anxious, say to yourself, \"This is not an emergency. I feel uncomfortable, but I am not in danger. I can keep going even if I feel anxious. \" · Be kind to your body: ¨ Relieve tension with exercise or a massage. ¨ Get enough rest. 
¨ Avoid alcohol, caffeine, nicotine, and illegal drugs. They can increase your anxiety level and cause sleep problems. ¨ Learn and do relaxation techniques. See below for more about these techniques. · Engage your mind. Get out and do something you enjoy. Go to a funny movie, or take a walk or hike. Plan your day. Having too much or too little to do can make you anxious. · Keep a record of your symptoms. Discuss your fears with a good friend or family member, or join a support group for people with similar problems. Talking to others sometimes relieves stress. · Get involved in social groups, or volunteer to help others. Being alone sometimes makes things seem worse than they are. · Get at least 30 minutes of exercise on most days of the week to relieve stress. Walking is a good choice. You also may want to do other activities, such as running, swimming, cycling, or playing tennis or team sports. Relaxation techniques Do relaxation exercises 10 to 20 minutes a day. You can play soothing, relaxing music while you do them, if you wish. · Tell others in your house that you are going to do your relaxation exercises. Ask them not to disturb you. · Find a comfortable place, away from all distractions and noise. · Lie down on your back, or sit with your back straight. · Focus on your breathing. Make it slow and steady. · Breathe in through your nose. Breathe out through either your nose or mouth. · Breathe deeply, filling up the area between your navel and your rib cage. Breathe so that your belly goes up and down. · Do not hold your breath. · Breathe like this for 5 to 10 minutes. Notice the feeling of calmness throughout your whole body. As you continue to breathe slowly and deeply, relax by doing the following for another 5 to 10 minutes: · Tighten and relax each muscle group in your body. You can begin at your toes and work your way up to your head. · Imagine your muscle groups relaxing and becoming heavy. · Empty your mind of all thoughts. · Let yourself relax more and more deeply. · Become aware of the state of calmness that surrounds you. · When your relaxation time is over, you can bring yourself back to alertness by moving your fingers and toes and then your hands and feet and then stretching and moving your entire body. Sometimes people fall asleep during relaxation, but they usually wake up shortly afterward. · Always give yourself time to return to full alertness before you drive a car or do anything that might cause an accident if you are not fully alert. Never play a relaxation tape while you drive a car. When should you call for help? Call 911 anytime you think you may need emergency care. For example, call if: 
? · You feel you cannot stop from hurting yourself or someone else. ? Keep the numbers for these national suicide hotlines: 8-826-865-TALK (2-746.719.3863) and 3-796-JOCVAMH (7-683.551.6009). If you or someone you know talks about suicide or feeling hopeless, get help right away. ? Watch closely for changes in your health, and be sure to contact your doctor if: 
? · You have anxiety or fear that affects your life. ? · You have symptoms of anxiety that are new or different from those you had before. Where can you learn more? Go to http://kenneth-peter.info/. Enter P754 in the search box to learn more about \"Anxiety Disorder: Care Instructions. \" Current as of: May 12, 2017 Content Version: 11.4 © 3323-0941 Healthwise, Your Tribute. Care instructions adapted under license by [employer] (which disclaims liability or warranty for this information). If you have questions about a medical condition or this instruction, always ask your healthcare professional. [address] any warranty or liability for your use of this information. Introducing [facility]! Lonnie Campos introduces [employer]i 24/7 patient portal. Now you can access parts of your medical record, email your doctor's office, and request medication refills online. 1. In your internet browser, go to [email or web address] 2. Click on the First Time User? Click Here link in the Sign In box. You will see the New Member Sign Up page. 3. Enter your Taxi 24/7 Access Code exactly as it appears below. You will not need to use this code after youve completed the sign-up process. If you do not sign up before the expiration date, you must request a new code. · Taxi 24/7 Access Code: [email or web address] Expires: 5/21/2018  1:35 PM 
 
4. Enter the last four digits of your Social Security Number (xxxx) and Date of Birth (mm/dd/yyyy) as indicated and click Submit. You will be taken to the next sign-up page. 5. Create a Taxi 24/7 ID. This will be your [employer] 24/7 login ID and cannot be changed, so think of one that is secure and easy to remember. 6. Create a [employer] 24/7 password. You can change your password at any time. 7. Enter your Password Reset Question and Answer. This can be used at a later time if you forget your password. 8. Enter your e-mail address. You will receive e-mail notification when new information is available in [address] 9. Click Sign Up. You can now view and download portions of your medical record. 10. Click the Download Summary menu link to download a portable copy of your medical information. If you have questions, please visit the Frequently Asked Questions section of the Boostable website. Remember, Boostable is NOT to be used for urgent needs. For medical emergencies, dial 911. Now available from your iPhone and Android! Please provide this summary of care documentation to your next provider. Your primary care clinician is listed as Bill Nye. If you have any questions after today's visit, please call [phone].

## 2018-02-20 NOTE — PROGRESS NOTES
CC:   Chief Complaint   Patient presents with    Hypertension    Cholesterol Problem    Immunization/Injection       HISTORY OF PRESENT ILLNESS  Ivon Morales is a 47 y.o. male. Presents for 3 month follow up evaluation. He has HTN, hyperlipidemia, generalized anxiety disorder, and IBS. Anxiety review  Today he complains of increased irritability. Feels like is he is not dealing with stress well at all. Recently broke a $200 tennis racket just because he became frustrated that he has not playing well. Treatment includes Buspar and no other therapies. He denies recurrent thoughts of death and suicidal thoughts without plan. He experiences the following side effects from the treatment: somnolence.     Cardiovascular Review  The patient has hypertension and hyperlipidemia.  Reports his wife read about statins and told him they were not good to take so he stopped taking simvastatin 2 months ago. No medication side effects noted when he was on simvastatin.  Started taking turmeric and fish oil capsules. Diet and Lifestyle: generally follows a low fat low cholesterol diet, generally follows a low sodium diet, exercises regularly.  Lab review: discussed with patient.       Soc Hx  . Has 2 daughters (oldest in college at [employer] is pre-med; youngest daughter wants to be a dentist). Works in IT for [employer]; works from home. Never smoker. Drinks 1-2 beers a month (previously, 3-4 beers a day on weekends). Denies recreational drug use. Walks for exercise.      ROS  A complete review of systems was performed and is negative except for those mentioned in the HPI. Patient Active Problem List   Diagnosis Code    IBS (irritable bowel syndrome) K58.9    Essential hypertension I10    Hyperlipidemia E78.5    Depression with anxiety F41.8    Nephrolithiasis N20.0     Past Medical History:   Diagnosis Date    ADD (attention deficit disorder)     Cholelithiasis 9/29/14    CT in 8/14.  Saw Dr. Gill Valdivia; asymptomatic gallstones    GERD (gastroesophageal reflux disease)     possibly    Hyperlipidemia     high cholesterol    Hypertension     Nephrolithiasis     KIDNEY STONES    Other recurrent depressive disorders (HCC)     generalized anxiety disorder     Allergies   Allergen Reactions    Zoloft [Sertraline] Other (comments)     Abdominal pain, nausea     Current Outpatient Prescriptions   Medication Sig Dispense Refill    enalapril (VASOTEC) 10 mg tablet Take 20 mg by mouth daily.  busPIRone (BUSPAR) 15 mg tablet Take 7.5 mg by mouth two (2) times a day.  DOCOSAHEXANOIC ACID/EPA (FISH OIL PO) Take 2,000 mg by mouth daily.  OTHER Tumeric Curcumin supplement 500 mg      hyoscyamine sulfate (CYSTOSPAZ) 0.125 mg tablet take 1 tablet by mouth every 4 hours if needed  Indications: Irritable Bowel Syndrome 48 Tab 2         PHYSICAL EXAM  Visit Vitals    /78 (BP 1 Location: Left arm, BP Patient Position: Sitting)    Pulse 60    Temp 97.9 °F (36.6 °C) (Oral)    Resp 16    Ht 6' 1\" (1.854 m)    Wt 186 lb (84.4 kg)    SpO2 97%    BMI 24.54 kg/m2       General: Well-developed and well-nourished, no distress. HEENT:  Head normocephalic/atraumatic, no scleral icterus  Lungs:  Clear to ausculation bilaterally. Good air movement. Heart:  Regular rate and rhythm, normal S1 and S2, no murmur, gallop, or rub  Extremities: No clubbing, cyanosis, or edema. Neurological: Alert and oriented. Psychiatric: Normal mood and affect.  Behavior is normal.     Results for orders placed or performed in visit on 49/66/31   METABOLIC PANEL, COMPREHENSIVE   Result Value Ref Range    Glucose 89 65 - 99 mg/dL    BUN 11 6 - 24 mg/dL    Creatinine 1.05 0.76 - 1.27 mg/dL    GFR est non-AA 82 >59 mL/min/1.73    GFR est AA 95 >59 mL/min/1.73    BUN/Creatinine ratio 10 9 - 20    Sodium 142 134 - 144 mmol/L    Potassium 4.7 3.5 - 5.2 mmol/L    Chloride 99 96 - 106 mmol/L    CO2 27 18 - 29 mmol/L    Calcium 9.8 8.7 - 10.2 mg/dL    Protein, total 7.2 6.0 - 8.5 g/dL    Albumin 4.8 3.5 - 5.5 g/dL    GLOBULIN, TOTAL 2.4 1.5 - 4.5 g/dL    A-G Ratio 2.0 1.2 - 2.2    Bilirubin, total 0.7 0.0 - 1.2 mg/dL    Alk. phosphatase 84 39 - 117 IU/L    AST (SGOT) 20 0 - 40 IU/L    ALT (SGPT) 23 0 - 44 IU/L   CBC WITH AUTOMATED DIFF   Result Value Ref Range    WBC 4.9 3.4 - 10.8 x10E3/uL    RBC 4.88 4.14 - 5.80 x10E6/uL    HGB 14.8 13.0 - 17.7 g/dL    HCT 42.5 37.5 - 51.0 %    MCV 87 79 - 97 fL    MCH 30.3 26.6 - 33.0 pg    MCHC 34.8 31.5 - 35.7 g/dL    RDW 14.6 12.3 - 15.4 %    PLATELET 209 369 - 202 x10E3/uL    NEUTROPHILS 56 Not Estab. %    Lymphocytes 32 Not Estab. %    MONOCYTES 11 Not Estab. %    EOSINOPHILS 1 Not Estab. %    BASOPHILS 0 Not Estab. %    ABS. NEUTROPHILS 2.8 1.4 - 7.0 x10E3/uL    Abs Lymphocytes 1.6 0.7 - 3.1 x10E3/uL    ABS. MONOCYTES 0.5 0.1 - 0.9 x10E3/uL    ABS. EOSINOPHILS 0.1 0.0 - 0.4 x10E3/uL    ABS. BASOPHILS 0.0 0.0 - 0.2 x10E3/uL    IMMATURE GRANULOCYTES 0 Not Estab. %    ABS. IMM. GRANS. 0.0 0.0 - 0.1 x10E3/uL   HEMOGLOBIN A1C WITH EAG   Result Value Ref Range    Hemoglobin A1c 5.1 4.8 - 5.6 %    Estimated average glucose 100 mg/dL   LIPID PANEL   Result Value Ref Range    Cholesterol, total 248 (H) 100 - 199 mg/dL    Triglyceride 185 (H) 0 - 149 mg/dL    HDL Cholesterol 46 >39 mg/dL    VLDL, calculated 37 5 - 40 mg/dL    LDL, calculated 165 (H) 0 - 99 mg/dL   PSA SCREENING (SCREENING)   Result Value Ref Range    Prostate Specific Ag 1.1 0.0 - 4.0 ng/mL   TSH RFX ON ABNORMAL TO FREE T4   Result Value Ref Range    TSH 3.110 0.450 - 4.500 uIU/mL   CVD REPORT   Result Value Ref Range    INTERPRETATION Note          ASSESSMENT AND PLAN    ICD-10-CM ICD-9-CM    1. Essential hypertension I10 401.9 enalapril (VASOTEC) 10 mg tablet   2. Generalized anxiety disorder F41.1 300.02 citalopram (CELEXA) 10 mg tablet   3. Mixed hyperlipidemia E78.2 272.2 simvastatin (ZOCOR) 80 mg tablet   4.  Irritable bowel syndrome with diarrhea K58.0 564.1    5. Encounter for immunization Z23 V03.89 INFLUENZA VIRUS VAC QUAD,SPLIT,PRESV FREE SYRINGE IM      MI IMMUNIZ ADMIN,1 SINGLE/COMB VAC/TOXOID     Discussed labs with patient. Tot chol and LDL significantly increased since stopping statin. He is willing to resume taking it. Diagnoses and all orders for this visit:    1. Essential hypertension  Well-controlled. Continue enalapril. -     Refill enalapril (VASOTEC) 10 mg tablet; Take 2 Tabs by mouth daily. Indications: hypertension    2. Generalized anxiety disorder  Not fully controlled on Buspar 7.5 mg twice daily; higher doses make him too sleepy. Add Celexa. -     Start citalopram (CELEXA) 10 mg tablet; Take 1 Tab by mouth daily. Indications: Generalized Anxiety Disorder    3. Mixed hyperlipidemia  -     Restart simvastatin (ZOCOR) 80 mg tablet; take 1 tablet by mouth every evening  Indications: hyperlipidemia    4. Irritable bowel syndrome with diarrhea  Controlled. Takes hyoscyamine infrequently. 5. Encounter for immunization  -     Influenza virus vaccine (QUADRIVALENT PRES FREE SYRINGE) IM (47542)  -     MI IMMUNIZ ADMIN,1 SINGLE/COMB VAC/TOXOID      Follow-up Disposition:  Return in about 3 months (around 5/20/2018), or if symptoms worsen or fail to improve, for Anxiety follow up. Provided patient and/or family with advanced directive information and answered pertinent questions. Encouraged patient to provide a copy of advanced directive to the office when available. I have discussed the diagnosis with the patient and the intended plan as seen in the above orders. Patient is in agreement. The patient has received an after-visit summary and questions were answered concerning future plans. I have discussed medication side effects and warnings with the patient as well.

## 2019-06-14 DIAGNOSIS — E78.2 MIXED HYPERLIPIDEMIA: ICD-10-CM

## 2019-06-14 DIAGNOSIS — I10 ESSENTIAL HYPERTENSION: ICD-10-CM

## 2019-06-14 RX ORDER — SIMVASTATIN 80 MG/1
TABLET, FILM COATED ORAL
Qty: 10 TAB | Refills: 0 | Status: SHIPPED | OUTPATIENT
Start: 2019-06-14 | End: 2019-06-19 | Stop reason: SDUPTHER

## 2019-06-14 RX ORDER — ENALAPRIL MALEATE 10 MG/1
20 TABLET ORAL DAILY
Qty: 10 TAB | Refills: 0 | Status: SHIPPED | OUTPATIENT
Start: 2019-06-14 | End: 2019-06-19 | Stop reason: SDUPTHER

## 2019-06-14 NOTE — TELEPHONE ENCOUNTER
Pt has appt 06.19.19       Refill     Requested Prescriptions     Pending Prescriptions Disp Refills    enalapril (VASOTEC) 10 mg tablet 180 Tab 3     Sig: Take 2 Tabs by mouth daily.  Indications: high blood pressure    simvastatin (ZOCOR) 80 mg tablet 90 Tab 3     Sig: take 1 tablet by mouth every evening  Indications: excessive fat in the blood

## 2019-06-14 NOTE — TELEPHONE ENCOUNTER
REFILL     PCP: Lonnie Oliveira MD     Last appt: Visit date not found   Future Appointments   Date Time Provider Geovanna Ferrell   6/19/2019  2:20 PM Lonnie Oliveira  [facility]        Requested Prescriptions     Pending Prescriptions Disp Refills    enalapril (VASOTEC) 10 mg tablet 180 Tab 3     Sig: Take 2 Tabs by mouth daily.  Indications: high blood pressure    simvastatin (ZOCOR) 80 mg tablet 90 Tab 3     Sig: take 1 tablet by mouth every evening  Indications: excessive fat in the blood

## 2019-06-19 ENCOUNTER — OFFICE VISIT (OUTPATIENT)
Dept: INTERNAL MEDICINE CLINIC | Facility: CLINIC | Age: 54
End: 2019-06-19

## 2019-06-19 VITALS
TEMPERATURE: 98.6 F | RESPIRATION RATE: 16 BRPM | HEIGHT: 73 IN | SYSTOLIC BLOOD PRESSURE: 137 MMHG | WEIGHT: 192.6 LBS | HEART RATE: 73 BPM | BODY MASS INDEX: 25.53 KG/M2 | DIASTOLIC BLOOD PRESSURE: 84 MMHG | OXYGEN SATURATION: 96 %

## 2019-06-19 DIAGNOSIS — Z13.1 SCREENING FOR DIABETES MELLITUS: ICD-10-CM

## 2019-06-19 DIAGNOSIS — I10 ESSENTIAL HYPERTENSION: ICD-10-CM

## 2019-06-19 DIAGNOSIS — Z12.5 SCREENING FOR PROSTATE CANCER: ICD-10-CM

## 2019-06-19 DIAGNOSIS — E66.3 OVERWEIGHT (BMI 25.0-29.9): ICD-10-CM

## 2019-06-19 DIAGNOSIS — F41.9 ANXIETY: ICD-10-CM

## 2019-06-19 DIAGNOSIS — E78.2 MIXED HYPERLIPIDEMIA: ICD-10-CM

## 2019-06-19 DIAGNOSIS — I10 ESSENTIAL HYPERTENSION: Primary | ICD-10-CM

## 2019-06-19 RX ORDER — GUAIFENESIN 100 MG/5ML
81 LIQUID (ML) ORAL
COMMUNITY
End: 2019-12-19

## 2019-06-19 RX ORDER — SIMVASTATIN 80 MG/1
TABLET, FILM COATED ORAL
Qty: 10 TAB | Refills: 0 | Status: SHIPPED | OUTPATIENT
Start: 2019-06-19 | End: 2019-06-27 | Stop reason: SDUPTHER

## 2019-06-19 RX ORDER — ENALAPRIL MALEATE 20 MG/1
20 TABLET ORAL DAILY
Qty: 90 TAB | Refills: 3 | Status: SHIPPED | OUTPATIENT
Start: 2019-06-19 | End: 2020-05-31

## 2019-06-19 NOTE — PROGRESS NOTES
CC:   Chief Complaint   Patient presents with    Hypertension    Cholesterol Problem    Anxiety       HISTORY OF PRESENT ILLNESS  Jasmina Last is a 49 y.o. male. Presents for follow up evaluation. Last seen 2/20/18; did not follow up in 3 months as instructed. He has HTN, hyperlipidemia, generalized anxiety disorder, and IBS. He has no complaints today. Stopped taking Buspar and Celexa for anxiety several months ago and started using CBD oil. Later changed to less expensive product \"CBD flower\" that he smokes once daily. Says it looks like marijuana but does not cause a high and helps his anxiety quickly. Denies depressed mood, SI/HI. Cardiovascular Review  The patient has hypertension and hyperlipidemia. He reports taking only 1 tab daily on enalapril 10 mg instead of 2 tabs as instructed. Takes simvastatin as instructed, no medication side effects noted. Diet and Lifestyle: generally follows a low fat low cholesterol diet, generally follows a low sodium diet, no formal exercise but active during the day. Lab review: orders written for new lab studies as appropriate; see orders. Soc Hx  . Has 2 daughters (oldest at [employer] is pre-med; younger daughter is also at [employer] in kinesiology). Works in [employer] for [employer]; works from home. Never smoker. Drinks 1-2 beers a month. Plays tennis 2-3 times a week for exercise.      ROS  A complete review of systems was performed and is negative except for those mentioned in the HPI. Patient Active Problem List   Diagnosis Code    IBS (irritable bowel syndrome) K58.9    Essential hypertension I10    Hyperlipidemia E78.5    Depression with anxiety F41.8    Nephrolithiasis N20.0     Past Medical History:   Diagnosis Date    ADD (attention deficit disorder)     Cholelithiasis 9/29/14    CT in 8/14.  Saw Dr. Chito Porter; asymptomatic gallstones    GERD (gastroesophageal reflux disease)     possibly    Hyperlipidemia     high cholesterol    Hypertension     Nephrolithiasis     KIDNEY STONES    Other recurrent depressive disorders (HCC)     generalized anxiety disorder     Allergies   Allergen Reactions    Zoloft [Sertraline] Other (comments)     Abdominal pain, nausea       Current Outpatient Medications   Medication Sig Dispense Refill    minoxidil (ROGAINE EX) by Apply Externally route.  CANNABIDIOL, CBD, EXTRACT PO Take  by mouth. CBD flower or drops      aspirin 81 mg chewable tablet Take 81 mg by mouth daily as needed for Pain.  enalapril (VASOTEC) 10 mg tablet Take 2 Tabs by mouth daily. Indications: high blood pressure 10 Tab 0    simvastatin (ZOCOR) 80 mg tablet take 1 tablet by mouth every evening  Indications: excessive fat in the blood 10 Tab 0    DOCOSAHEXANOIC ACID/EPA (FISH OIL PO) Take 2,000 mg by mouth daily.  OTHER Tumeric Curcumin supplement 500 mg           PHYSICAL EXAM  Visit Vitals  /84   Pulse 73   Temp 98.6 °F (37 °C) (Oral)   Resp 16   Ht 6' 1\" (1.854 m)   Wt 192 lb 9.6 oz (87.4 kg)   SpO2 96%   BMI 25.41 kg/m²   6 lb weight increase since last clinic visit 16 mons ago    General: Well-developed and well-nourished, no distress. HEENT:  Head normocephalic/atraumatic, no scleral icterus  Neck: Supple. No carotid bruits, JVD, lymphadenopathy, or thyromegaly. Lungs:  Clear to ausculation bilaterally. Good air movement. Heart:  Regular rate and rhythm, normal S1 and S2, no murmur, gallop, or rub  Abdomen: Soft, non-distended, normal bowel sounds, no tenderness, no guarding, masses, rebound tenderness, or HSM. Extremities: No clubbing, cyanosis, or edema. 2+ pedal pulses bilaterally. Neurological: Alert and oriented. Non-focal exam.  Psychiatric: Normal mood and affect. Behavior is normal.         ASSESSMENT AND PLAN    ICD-10-CM ICD-9-CM    1. Essential hypertension D92 703.6 METABOLIC PANEL, COMPREHENSIVE      CBC WITH AUTOMATED DIFF   2.  Mixed hyperlipidemia E78.2 272.2 LIPID PANEL      TSH RFX ON ABNORMAL TO FREE T4   3. Anxiety F41.9 300.00    4. Screening for prostate cancer Z12.5 V76.44 PSA SCREENING (SCREENING)   5. Screening for diabetes mellitus Z13.1 V77.1 HEMOGLOBIN A1C WITH EAG   6. Overweight (BMI 25.0-29. 9) E66.3 278.02      Diagnoses and all orders for this visit:    1. Essential hypertension  BP mildly elevated at 137/84. Instructed to take enalapril 20 mg daily.  -     METABOLIC PANEL, COMPREHENSIVE; Future  -     CBC WITH AUTOMATED DIFF; Future    2. Mixed hyperlipidemia  Continue simvastatin 80 mg nightly. -     LIPID PANEL; Future  -     TSH RFX ON ABNORMAL TO FREE T4; Future    3. Anxiety  Controlled on CBD. 4. Screening for prostate cancer  -     PSA SCREENING (SCREENING); Future    5. Screening for diabetes mellitus  -     HEMOGLOBIN A1C WITH EAG; Future    6. Overweight (BMI 25.0-29. 9)  Discussed the patient's BMI with him. The BMI follow up plan is as follows: dietary management education, guidance, and counseling; encourage exercise; monitor weight; prescribed dietary intake. Follow up BMI in 6 months. Follow-up and Dispositions    · Return in about 6 months (around 12/19/2019), or if symptoms worsen or fail to improve, for HTN, hyperlipidemia; schedule for fasting labs later this week or next week. I have discussed the diagnosis with the patient and the intended plan as seen in the above orders. Patient is in agreement. The patient has received an after-visit summary and questions were answered concerning future plans. I have discussed medication side effects and warnings with the patient as well.

## 2019-06-19 NOTE — PROGRESS NOTES
Yumiko Weiner  Identified pt with two pt identifiers(name and ). Chief Complaint   Patient presents with    Hypertension    Cholesterol Problem    Anxiety       1. Have you been to the ER, urgent care clinic since your last visit? Hospitalized since your last visit? [facility] ear infection    2. Have you seen or consulted any other health care providers outside of the [facility] since your last visit? Include any pap smears or colon screening. NO    Today's provider has been notified of reason for visit, vitals and flowsheets obtained on patients. Patient received paperwork for advance directive during previous visit but has not completed at this time     Reviewed record In preparation for visit, huddled with provider and have obtained necessary documentation      Health Maintenance Due   Topic    Shingrix Vaccine Age 50> (1 of 2)       Wt Readings from Last 3 Encounters:   18 186 lb (84.4 kg)   17 188 lb (85.3 kg)   17 187 lb (84.8 kg)     Temp Readings from Last 3 Encounters:   18 97.9 °F (36.6 °C) (Oral)   17 98.6 °F (37 °C) (Oral)   17 98 °F (36.7 °C) (Oral)     BP Readings from Last 3 Encounters:   18 123/78   17 127/87   17 122/80     Pulse Readings from Last 3 Encounters:   18 60   17 60   17 63     There were no vitals filed for this visit.       Learning Assessment:  :     Learning Assessment 2015   PRIMARY LEARNER Patient Patient   HIGHEST LEVEL OF EDUCATION - PRIMARY LEARNER  4 YEARS OF COLLEGE 4 YEARS OF COLLEGE   BARRIERS PRIMARY LEARNER NONE NONE   CO-LEARNER CAREGIVER No No   PRIMARY LANGUAGE ENGLISH ENGLISH   LEARNER PREFERENCE PRIMARY LISTENING DEMONSTRATION   ANSWERED BY patients Patient   RELATIONSHIP SELF SELF       Depression Screening:  :     3 most recent PHQ Screens 2015   Little interest or pleasure in doing things Not at all   Feeling down, depressed, irritable, or hopeless Not at all   Total Score PHQ 2 0       Fall Risk Assessment:  :     No flowsheet data found. Abuse Screening:  :     No flowsheet data found. ADL Screening:  :     ADL Assessment 6/16/2015   Feeding yourself No Help Needed   Getting from bed to chair No Help Needed   Getting dressed No Help Needed   Bathing or showering No Help Needed   Walk across the room (includes cane/walker) No Help Needed   Using the telphone No Help Needed   Taking your medications No Help Needed   Preparing meals No Help Needed   Managing money (expenses/bills) No Help Needed   Moderately strenuous housework (laundry) No Help Needed   Shopping for personal items (toiletries/medicines) No Help Needed   Shopping for groceries No Help Needed   Driving No Help Needed   Climbing a flight of stairs No Help Needed   Getting to places beyond walking distances No Help Needed   Medication reconciliation up to date and corrected with patient at this time.

## 2019-06-19 NOTE — PATIENT INSTRUCTIONS

## 2019-06-22 LAB
ALBUMIN SERPL-MCNC: 5.1 G/DL (ref 3.5–5.5)
ALBUMIN/GLOB SERPL: 2.2 {RATIO} (ref 1.2–2.2)
ALP SERPL-CCNC: 87 IU/L (ref 39–117)
ALT SERPL-CCNC: 22 IU/L (ref 0–44)
AST SERPL-CCNC: 15 IU/L (ref 0–40)
BASOPHILS # BLD AUTO: 0 X10E3/UL (ref 0–0.2)
BASOPHILS NFR BLD AUTO: 0 %
BILIRUB SERPL-MCNC: 0.7 MG/DL (ref 0–1.2)
BUN SERPL-MCNC: 12 MG/DL (ref 6–24)
BUN/CREAT SERPL: 12 (ref 9–20)
CALCIUM SERPL-MCNC: 9.9 MG/DL (ref 8.7–10.2)
CHLORIDE SERPL-SCNC: 101 MMOL/L (ref 96–106)
CHOLEST SERPL-MCNC: 179 MG/DL (ref 100–199)
CO2 SERPL-SCNC: 27 MMOL/L (ref 20–29)
CREAT SERPL-MCNC: 0.98 MG/DL (ref 0.76–1.27)
EOSINOPHIL # BLD AUTO: 0.1 X10E3/UL (ref 0–0.4)
EOSINOPHIL NFR BLD AUTO: 1 %
ERYTHROCYTE [DISTWIDTH] IN BLOOD BY AUTOMATED COUNT: 14.1 % (ref 12.3–15.4)
EST. AVERAGE GLUCOSE BLD GHB EST-MCNC: 108 MG/DL
GLOBULIN SER CALC-MCNC: 2.3 G/DL (ref 1.5–4.5)
GLUCOSE SERPL-MCNC: 87 MG/DL (ref 65–99)
HBA1C MFR BLD: 5.4 % (ref 4.8–5.6)
HCT VFR BLD AUTO: 46 % (ref 37.5–51)
HDLC SERPL-MCNC: 46 MG/DL
HGB BLD-MCNC: 15 G/DL (ref 13–17.7)
IMM GRANULOCYTES # BLD AUTO: 0 X10E3/UL (ref 0–0.1)
IMM GRANULOCYTES NFR BLD AUTO: 0 %
LDLC SERPL CALC-MCNC: 104 MG/DL (ref 0–99)
LYMPHOCYTES # BLD AUTO: 1.4 X10E3/UL (ref 0.7–3.1)
LYMPHOCYTES NFR BLD AUTO: 28 %
MCH RBC QN AUTO: 29.5 PG (ref 26.6–33)
MCHC RBC AUTO-ENTMCNC: 32.6 G/DL (ref 31.5–35.7)
MCV RBC AUTO: 91 FL (ref 79–97)
MONOCYTES # BLD AUTO: 0.6 X10E3/UL (ref 0.1–0.9)
MONOCYTES NFR BLD AUTO: 12 %
NEUTROPHILS # BLD AUTO: 3.1 X10E3/UL (ref 1.4–7)
NEUTROPHILS NFR BLD AUTO: 59 %
PLATELET # BLD AUTO: 262 X10E3/UL (ref 150–450)
POTASSIUM SERPL-SCNC: 4.1 MMOL/L (ref 3.5–5.2)
PROT SERPL-MCNC: 7.4 G/DL (ref 6–8.5)
PSA SERPL-MCNC: 0.9 NG/ML (ref 0–4)
RBC # BLD AUTO: 5.08 X10E6/UL (ref 4.14–5.8)
SODIUM SERPL-SCNC: 144 MMOL/L (ref 134–144)
TRIGL SERPL-MCNC: 147 MG/DL (ref 0–149)
TSH SERPL DL<=0.005 MIU/L-ACNC: 2.58 UIU/ML (ref 0.45–4.5)
VLDLC SERPL CALC-MCNC: 29 MG/DL (ref 5–40)
WBC # BLD AUTO: 5.2 X10E3/UL (ref 3.4–10.8)

## 2019-06-26 NOTE — PROGRESS NOTES
Your labs all returned normal. This includes normal kidney and liver tests, blood counts, thyroid, diabetes screening test, PSA, and cholesterol. Keep up the good work!

## 2019-06-27 DIAGNOSIS — E78.2 MIXED HYPERLIPIDEMIA: ICD-10-CM

## 2019-06-27 RX ORDER — SIMVASTATIN 80 MG/1
TABLET, FILM COATED ORAL
Qty: 90 TAB | Refills: 3 | Status: SHIPPED | OUTPATIENT
Start: 2019-06-27 | End: 2020-08-06 | Stop reason: ALTCHOICE

## 2019-06-27 NOTE — TELEPHONE ENCOUNTER
REFILL     PCP: Bronson Lopez MD     Last appt: 6/21/2019   Future Appointments   Date Time Provider Geovanna Ferrell   12/19/2019  8:10 AM Bronson Lopez MD [facility]        Requested Prescriptions     Pending Prescriptions Disp Refills    simvastatin (ZOCOR) 80 mg tablet 90 Tab 3     Sig: take 1 tablet by mouth every evening  Indications: excessive fat in the blood

## 2019-06-27 NOTE — TELEPHONE ENCOUNTER
----- Message from Bing Francisco sent at 6/26/2019  5:36 PM EDT -----  Regarding: Dr. Rich Padron Telephone   Contact: [phone]  Pt requesting Rx Simvastatin 80 mg to be sent into [facility] on file for a 90 day supply. Pt would like to have this available before 7/1/19 because of the payment that will need to be paid at the start of the month for insurance purposes.

## 2019-06-28 ENCOUNTER — TELEPHONE (OUTPATIENT)
Dept: INTERNAL MEDICINE CLINIC | Facility: CLINIC | Age: 54
End: 2019-06-28

## 2019-06-28 NOTE — TELEPHONE ENCOUNTER
He should continue taking Vasotec 20 mg twice a day and check his BP once daily for the next 10 days. Call with BP numbers or schedule an appointment to be seen after that time.

## 2019-06-28 NOTE — TELEPHONE ENCOUNTER
Patient states he has not been feeling well. He took his BP and it is 150/100. He is currently taking vasotec 20 mg twice a day. Please advise.

## 2019-06-28 NOTE — TELEPHONE ENCOUNTER
Patient came by the office and was given instructions per Dr Jude Frey. Patient stated understanding.

## 2019-07-11 ENCOUNTER — TELEPHONE (OUTPATIENT)
Dept: INTERNAL MEDICINE CLINIC | Facility: CLINIC | Age: 54
End: 2019-07-11

## 2019-07-11 DIAGNOSIS — I10 ESSENTIAL HYPERTENSION: Primary | ICD-10-CM

## 2019-07-11 NOTE — TELEPHONE ENCOUNTER
Message sent to Dr Greg Dalton in My Chart message from patient:  Hi, I have monitored my BP for 10 days per your request and I think I need new medication ASAP.       Date BP   7/1 150/100   7/2 139/101   7/3 149/96   7/4 NA   7/5   158/94   7/6 156/104   7/7 134/92   7/8 138/92   7/9 136/85* *later in day after long tennis match   7/10 NA   7/11 142/104     **taking 10 MG of Enalapril in the morning and again in the evening as well as a baby aspirin and play tennis almost every day for 2-3 hours all while trying to limit my salt intake.

## 2019-07-11 NOTE — TELEPHONE ENCOUNTER
Pt wanted to speak with the nurse about his bp readings. He did not give me specific readings just stated there was a lot of information and that his bp has been all over the place.      I did sign him up for [employer] so that he could email the results to the doctor

## 2019-07-12 RX ORDER — HYDROCHLOROTHIAZIDE 12.5 MG/1
12.5 TABLET ORAL DAILY
Qty: 30 TAB | Refills: 1 | Status: SHIPPED | OUTPATIENT
Start: 2019-07-12 | End: 2019-09-05 | Stop reason: SDUPTHER

## 2019-07-12 NOTE — TELEPHONE ENCOUNTER
Inform patient that Dr. Trish Morales has sent in a prescription for a medication called HCTZ 12.5 mg for him to start taking 1 time a day in addition to enalapril 10 mg 2 times a day. It will take about 2 weeks minimum to see his BP go down. Let him know that his BP readings are high but not dangerously high so he does not have to be anxious about having a sudden heart attack or stroke.

## 2019-07-12 NOTE — TELEPHONE ENCOUNTER
Patient calling again- he does not want to go through the weekend without getting a new medication to help bring his BP down. He is very anxious about this. Verified pharmacy is correct.

## 2019-09-05 DIAGNOSIS — I10 ESSENTIAL HYPERTENSION: ICD-10-CM

## 2019-09-05 RX ORDER — HYDROCHLOROTHIAZIDE 12.5 MG/1
TABLET ORAL
Qty: 30 TAB | Refills: 0 | Status: SHIPPED | OUTPATIENT
Start: 2019-09-05 | End: 2019-10-05 | Stop reason: SDUPTHER

## 2019-12-19 ENCOUNTER — OFFICE VISIT (OUTPATIENT)
Dept: INTERNAL MEDICINE CLINIC | Facility: CLINIC | Age: 54
End: 2019-12-19

## 2019-12-19 VITALS
RESPIRATION RATE: 16 BRPM | BODY MASS INDEX: 25.31 KG/M2 | OXYGEN SATURATION: 97 % | TEMPERATURE: 97.6 F | HEIGHT: 73 IN | WEIGHT: 191 LBS | DIASTOLIC BLOOD PRESSURE: 84 MMHG | HEART RATE: 62 BPM | SYSTOLIC BLOOD PRESSURE: 137 MMHG

## 2019-12-19 DIAGNOSIS — Z23 NEED FOR SHINGLES VACCINE: ICD-10-CM

## 2019-12-19 DIAGNOSIS — Z23 ENCOUNTER FOR IMMUNIZATION: ICD-10-CM

## 2019-12-19 DIAGNOSIS — E78.2 MIXED HYPERLIPIDEMIA: ICD-10-CM

## 2019-12-19 DIAGNOSIS — F41.1 GENERALIZED ANXIETY DISORDER: ICD-10-CM

## 2019-12-19 DIAGNOSIS — M25.532 LEFT WRIST PAIN: ICD-10-CM

## 2019-12-19 DIAGNOSIS — I10 ESSENTIAL HYPERTENSION: Primary | ICD-10-CM

## 2019-12-19 NOTE — PROGRESS NOTES
CC:   Chief Complaint   Patient presents with    Hypertension    Cholesterol Problem    Immunization/Injection    Wrist Pain     left wrist       HISTORY OF PRESENT ILLNESS  Hussein Abraham is a 49 y.o. male. Presents for 6 month follow up evaluation. He has HTN, hyperlipidemia, generalized anxiety disorder, and IBS. He complains of left wrist pain since August.  Intermittent, sharp tingling sensation radial side of wrist with certain movements, no known trauma or injury. Hurts when he plays tennis, so has not been playing lately. Even bike riding hurts the wrist.    Cardiovascular Review  The patient has hypertension and hyperlipidemia.  Taking medications as instructed, no medication side effects noted.  Diet and Lifestyle: generally follows a low fat low cholesterol diet, generally follows a low sodium diet, no formal exercise but active during the day.       Soc Hx  . Has 2 daughters (oldest at [employer] is pre-med; younger daughter is also at [employer] in kinesiology). Works in [employer] for [employer]; works from home. Never smoker. Drinks 1-2 beers a month. Plays tennis 2-3 times a week for exercise.      ROS  A complete review of systems was performed and is negative except for those mentioned in the HPI. Patient Active Problem List   Diagnosis Code    IBS (irritable bowel syndrome) K58.9    Essential hypertension I10    Hyperlipidemia E78.5    Depression with anxiety F41.8    Nephrolithiasis N20.0     Past Medical History:   Diagnosis Date    ADD (attention deficit disorder)     Cholelithiasis 9/29/14    CT in 8/14.  Saw Dr. Kaylah Stoner; asymptomatic gallstones    GERD (gastroesophageal reflux disease)     possibly    Hyperlipidemia     high cholesterol    Hypertension     Nephrolithiasis     KIDNEY STONES    Other recurrent depressive disorders (HCC)     generalized anxiety disorder     Allergies   Allergen Reactions    Zoloft [Sertraline] Other (comments)     Abdominal pain, nausea Current Outpatient Medications   Medication Sig Dispense Refill    hydroCHLOROthiazide (HYDRODIURIL) 12.5 mg tablet TAKE 1 TABLET BY MOUTH EVERY DAY 30 Tab 3    simvastatin (ZOCOR) 80 mg tablet take 1 tablet by mouth every evening  Indications: excessive fat in the blood 90 Tab 3    minoxidil (ROGAINE EX) by Apply Externally route.  CANNABIDIOL, CBD, EXTRACT PO Take  by mouth. CBD flower or drops      enalapril (VASOTEC) 20 mg tablet Take 1 Tab by mouth daily. Indications: high blood pressure 90 Tab 3         PHYSICAL EXAM  Visit Vitals  /84   Pulse 62   Temp 97.6 °F (36.4 °C) (Oral)   Resp 16   Ht 6' 1\" (1.854 m)   Wt 191 lb (86.6 kg)   SpO2 97%   BMI 25.20 kg/m²   1 lb weight decrease since last clinic visit 6 months ago    General: Well-developed and well-nourished, no distress. HEENT:  Head normocephalic/atraumatic, no scleral icterus  Lungs:  Clear to ausculation bilaterally. Good air movement. Heart:  Regular rate and rhythm, normal S1 and S2, no murmur, gallop, or rub  Extremities: No clubbing, cyanosis, or edema. No wrist tenderness. Normal ROM. Neurological: Alert and oriented. Psychiatric: Normal mood and affect. Behavior is normal.         ASSESSMENT AND PLAN    ICD-10-CM ICD-9-CM    1. Essential hypertension I10 401.9    2. Left wrist pain M25.532 719.43 REFERRAL TO ORTHOPEDICS      XR WRIST LT AP/LAT/OBL MIN 3V   3. Mixed hyperlipidemia E78.2 272.2    4. Generalized anxiety disorder F41.1 300.02    5. Encounter for immunization Z23 V03.89 INFLUENZA VIRUS VAC QUAD,SPLIT,PRESV FREE SYRINGE IM      DC IMMUNIZ ADMIN,1 SINGLE/COMB VAC/TOXOID   6. Need for shingles vaccine Z23 V04.89      Diagnoses and all orders for this visit:    1. Essential hypertension  Controlled. Continue enalapril 20 mg and HCTZ 12.5 mg daily. 2. Left wrist pain  May be chronic tendonitis. -     REFERRAL TO ORTHOPEDICS  -     XR WRIST LT AP/LAT/OBL MIN 3V; Future    3.  Mixed hyperlipidemia  Controlled on simvastatin. 4. Generalized anxiety disorder  Recommended he stop smoking CBD flower. Patient Instructions  Try CBD oil 25 mg twice a day for anxiety. 5. Encounter for immunization  -     INFLUENZA VIRUS VAC QUAD,SPLIT,PRESV FREE SYRINGE IM  -     WA IMMUNIZ ADMIN,1 SINGLE/COMB VAC/TOXOID    6. Need for shingles vaccine    Plan fasting labs at next clinic visit. Follow-up and Dispositions    · Return in about 6 months (around 6/19/2020), or if symptoms worsen or fail to improve, for HTN, anxiety. I have discussed the diagnosis with the patient and the intended plan as seen in the above orders. Patient is in agreement. The patient has received an after-visit summary and questions were answered concerning future plans. I have discussed medication side effects and warnings with the patient as well.

## 2019-12-19 NOTE — PATIENT INSTRUCTIONS
Patient Instructions Try CBD oil 25 mg twice a day for anxiety. Vaccine Information Statement Influenza (Flu) Vaccine (Inactivated or Recombinant): What You Need to Know Many Vaccine Information Statements are available in Georgian and other languages. See www.immunize.org/vis Hojas de información sobre vacunas están disponibles en español y en muchos otros idiomas. Visite www.immunize.org/vis 1. Why get vaccinated? Influenza vaccine can prevent influenza (flu). Flu is a contagious disease that spreads around the [facility] every year, usually between October and May. Anyone can get the flu, but it is more dangerous for some people. Infants and young children, people 72years of age and older, pregnant women, and people with certain health conditions or a weakened immune system are at greatest risk of flu complications. Pneumonia, bronchitis, sinus infections and ear infections are examples of flu-related complications. If you have a medical condition, such as heart disease, cancer or diabetes, flu can make it worse. Flu can cause fever and chills, sore throat, muscle aches, fatigue, cough, headache, and runny or stuffy nose. Some people may have vomiting and diarrhea, though this is more common in children than adults. Each year thousands of people in the [facility] die from flu, and many more are hospitalized. Flu vaccine prevents millions of illnesses and flu-related visits to the doctor each year. 2. Influenza vaccines CDC recommends everyone 10months of age and older get vaccinated every flu season. Children 6 months through 6years of age may need 2 doses during a single flu season. Everyone else needs only 1 dose each flu season. It takes about 2 weeks for protection to develop after vaccination. There are many flu viruses, and they are always changing.  Each year a new flu vaccine is made to protect against three or four viruses that are likely to cause disease in the upcoming flu season. Even when the vaccine doesnt exactly match these viruses, it may still provide some protection. Influenza vaccine does not cause flu. Influenza vaccine may be given at the same time as other vaccines. 3. Talk with your health care provider Tell your vaccine provider if the person getting the vaccine: 
 Has had an allergic reaction after a previous dose of influenza vaccine, or has any severe, life-threatening allergies.  Has ever had Guillain-Barré Syndrome (also called GBS). In some cases, your health care provider may decide to postpone influenza vaccination to a future visit. People with minor illnesses, such as a cold, may be vaccinated. People who are moderately or severely ill should usually wait until they recover before getting influenza vaccine. Your health care provider can give you more information. 4. Risks of a reaction  Soreness, redness, and swelling where shot is given, fever, muscle aches, and headache can happen after influenza vaccine.  There may be a very small increased risk of Guillain-Barré Syndrome (GBS) after inactivated influenza vaccine (the flu shot). Arjun Ready children who get the flu shot along with pneumococcal vaccine (PCV13), and/or DTaP vaccine at the same time might be slightly more likely to have a seizure caused by fever. Tell your health care provider if a child who is getting flu vaccine has ever had a seizure. People sometimes faint after medical procedures, including vaccination. Tell your provider if you feel dizzy or have vision changes or ringing in the ears. As with any medicine, there is a very remote chance of a vaccine causing a severe allergic reaction, other serious injury, or death. 5. What if there is a serious problem? An allergic reaction could occur after the vaccinated person leaves the clinic.  If you see signs of a severe allergic reaction (hives, swelling of the face and throat, difficulty breathing, a fast heartbeat, dizziness, or weakness), call 9-1-1 and get the person to the nearest hospital. 
 
For other signs that concern you, call your health care provider. Adverse reactions should be reported to the Vaccine Adverse Event Reporting System (VAERS). Your health care provider will usually file this report, or you can do it yourself. Visit the VAERS website at www.vaers. [facility].gov or call 0-366.916.2112. VAERS is only for reporting reactions, and VAERS staff do not give medical advice. 6. The National Vaccine Injury Compensation Program 
 
The [facility] Vaccine Injury Compensation Program (VICP) is a federal program that was created to compensate people who may have been injured by certain vaccines. Visit the VICP website at www.hrsa.gov/vaccinecompensation or call 4-802.567.9906 to learn about the program and about filing a claim. There is a time limit to file a claim for compensation. 7. How can I learn more?  Ask your health care provider.  Call your local or state health department.  Contact the Centers for Disease Control and Prevention (CDC): 
- Call 5-835.820.5904 (1-800-CDC-INFO) or 
- Visit CDCs influenza website at www.cdc.gov/flu Vaccine Information Statement (Interim) Inactivated Influenza Vaccine 8/15/2019 
[address] [place] [id number]-[id number] Department of Health and The Art Commission Centers for Disease Control and Prevention Office Use Only

## 2019-12-19 NOTE — PROGRESS NOTES
Axel Victoria  Identified pt with two pt identifiers(name and ). Chief Complaint   Patient presents with    Hypertension    Cholesterol Problem   Per Dr. Horacio Perera verbal order read back orders placed for flu vaccine. Flu vaccine Quad 0.5 ml given right deltoid IM. Allergies checked and consent signed by patient. VIS given. Patient tolerated procedure with no adverse side effects. 1. Have you been to the ER, urgent care clinic since your last visit? Hospitalized since your last visit? NO    2. Have you seen or consulted any other health care providers outside of the [address] [place] [address] since your last visit? Include any pap smears or colon screening. Getting PT with [facility] for left knee    Today's provider has been notified of reason for visit, vitals and flowsheets obtained on patients. Patient received paperwork for advance directive during previous visit but has not completed at this time     Reviewed record In preparation for visit, huddled with provider and have obtained necessary documentation      Health Maintenance Due   Topic    Shingrix Vaccine Age 50> (1 of 2)    Influenza Age 5 to Adult        Wt Readings from Last 3 Encounters:   19 192 lb 9.6 oz (87.4 kg)   18 186 lb (84.4 kg)   17 188 lb (85.3 kg)     Temp Readings from Last 3 Encounters:   19 98.6 °F (37 °C) (Oral)   18 97.9 °F (36.6 °C) (Oral)   17 98.6 °F (37 °C) (Oral)     BP Readings from Last 3 Encounters:   19 137/84   18 123/78   17 127/87     Pulse Readings from Last 3 Encounters:   19 73   18 60   17 60     There were no vitals filed for this visit.       Learning Assessment:  :     Learning Assessment 2015   PRIMARY LEARNER Patient Patient   HIGHEST LEVEL OF EDUCATION - PRIMARY LEARNER  4 YEARS OF COLLEGE 4 YEARS OF COLLEGE   BARRIERS PRIMARY LEARNER NONE NONE   CO-LEARNER CAREGIVER No No   PRIMARY LANGUAGE ENGLISH ENGLISH   LEARNER PREFERENCE PRIMARY LISTENING DEMONSTRATION   ANSWERED BY patients Patient   RELATIONSHIP SELF SELF       Depression Screening:  :     3 most recent PHQ Screens 6/19/2019   Little interest or pleasure in doing things Not at all   Feeling down, depressed, irritable, or hopeless Not at all   Total Score PHQ 2 0       Fall Risk Assessment:  :     No flowsheet data found. Abuse Screening:  :     No flowsheet data found. ADL Screening:  :     ADL Assessment 6/16/2015   Feeding yourself No Help Needed   Getting from bed to chair No Help Needed   Getting dressed No Help Needed   Bathing or showering No Help Needed   Walk across the room (includes cane/walker) No Help Needed   Using the telphone No Help Needed   Taking your medications No Help Needed   Preparing meals No Help Needed   Managing money (expenses/bills) No Help Needed   Moderately strenuous housework (laundry) No Help Needed   Shopping for personal items (toiletries/medicines) No Help Needed   Shopping for groceries No Help Needed   Driving No Help Needed   Climbing a flight of stairs No Help Needed   Getting to places beyond walking distances No Help Needed       Medication reconciliation up to date and corrected with patient at this time.

## 2019-12-23 ENCOUNTER — TELEPHONE (OUTPATIENT)
Dept: INTERNAL MEDICINE CLINIC | Facility: CLINIC | Age: 54
End: 2019-12-23

## 2019-12-23 NOTE — TELEPHONE ENCOUNTER
Pt called back and stated that he will just review results on my chart and no longer needs a call back.

## 2020-01-17 ENCOUNTER — OFFICE VISIT (OUTPATIENT)
Dept: INTERNAL MEDICINE CLINIC | Facility: CLINIC | Age: 55
End: 2020-01-17

## 2020-01-17 VITALS
TEMPERATURE: 98.2 F | RESPIRATION RATE: 16 BRPM | SYSTOLIC BLOOD PRESSURE: 127 MMHG | WEIGHT: 193 LBS | DIASTOLIC BLOOD PRESSURE: 80 MMHG | OXYGEN SATURATION: 98 % | HEART RATE: 64 BPM | HEIGHT: 73 IN | BODY MASS INDEX: 25.58 KG/M2

## 2020-01-17 DIAGNOSIS — M25.562 CHRONIC PAIN OF LEFT KNEE: ICD-10-CM

## 2020-01-17 DIAGNOSIS — R11.0 NAUSEA: ICD-10-CM

## 2020-01-17 DIAGNOSIS — G89.29 CHRONIC PAIN OF LEFT KNEE: ICD-10-CM

## 2020-01-17 DIAGNOSIS — K58.0 IRRITABLE BOWEL SYNDROME WITH DIARRHEA: ICD-10-CM

## 2020-01-17 DIAGNOSIS — R10.84 GENERALIZED ABDOMINAL PAIN: Primary | ICD-10-CM

## 2020-01-17 RX ORDER — ONDANSETRON 8 MG/1
8 TABLET, ORALLY DISINTEGRATING ORAL
Qty: 12 TAB | Refills: 0 | Status: SHIPPED | OUTPATIENT
Start: 2020-01-17 | End: 2020-06-17

## 2020-01-17 NOTE — PATIENT INSTRUCTIONS
Learning About Low-Fat Eating What is low-fat eating? Most food has some fat in it. Your body needs some fat to be healthy. But some kinds of fats are healthier than others. In a low-fat eating plan, you try to choose healthier fats and eat fewer unhealthy fats. Healthy fats include olive and canola oil. Try to avoid eating too much saturated fat (such as in cheese and meats) and trans fat (a type of fat found in many packaged snack foods and other baked goods). You do not need to cut all fat from your diet. But you can make healthier choices about the types and amount of fat you eat. Even though it is a good idea to choose healthier fats, it is still important to be careful of how much fat you eat, because all fats are high in calories. What are the different types of fats? Unhealthy fats · Saturated fat. Saturated fats are mostly in animal foods, such as meat and dairy foods. Tropical oils, such as coconut oil, palm oil, and cocoa butter, are also saturated fats. · Trans fat. Trans fats include shortening, partially hydrogenated vegetable oils, and hydrogenated vegetable oils. Trans fats are made when a liquid fat is turned into a solid fat (for example, when corn oil is made into stick margarine). They are in many processed foods, such as cookies, crackers, and snack foods. Healthy fats · Monounsaturated fat. Monounsaturated fats are liquid at room temperature but get solid when refrigerated. Eating foods that are high in this fat may help lower your \"bad\" (LDL) cholesterol, keep your \"good\" (HDL) cholesterol level up, and lower your chances of getting coronary artery disease. This fat is found in canola oil, olive oil, peanut oil, olives, avocados, nuts, and nut butters. · Polyunsaturated fat. Polyunsaturated fats are liquid at room temperature. They are in safflower, sunflower, and corn oils. They are also the main fat in seafood.  Omega-3 fatty acids are types of polyunsaturated fat. Eating fish may lower your chances of getting coronary artery disease. Fatty fish such as salmon and mackerel contain these healthy fatty acids. So do ground flaxseeds and flaxseed oil, soybeans, walnuts, and seeds. Why cut down on unhealthy fats? Eating foods that contain saturated fats can raise the LDL (\"bad\") cholesterol in your blood. Having a high level of LDL cholesterol increases your chance of hardening of the arteries (atherosclerosis), which can lead to heart disease, heart attack, and stroke. Trans fat raises the level of \"bad\" LDL cholesterol in your blood and may lower the \"good\" HDL cholesterol in your blood. Trans fat can raise your risk of heart disease, heart attack, and stroke. In general: · No more than 10% of your daily calories should come from saturated fat. This is about 20 grams in a 2,000-calorie diet. · No more than 10% of your daily calories should come from polyunsaturated fat. This is about 20 grams in a 2,000-calorie diet. · Monounsaturated fats can be up to 15% of your daily calories. This is about 25 to 30 grams in a 2,000-calorie diet. If you're not sure how much fat you should be eating or how many calories you need each day to stay at a healthy weight, talk to a registered dietitian. He or she can help you create a plan that's right for you. What can you do to cut down on fat? Foods like cheese, butter, sausage, and desserts can have a lot of unhealthy fats. Try these tips for healthier meals at home and when you eat out. At home · Fill up on fruits, vegetables, and whole grains. · Think of meat as a side dish instead of as the main part of your meal. 
· When you do eat meat, make it extra-lean ground beef (97% lean), ground turkey breast (without skin added), meats with fat trimmed off before cooking, or skinless chicken. · Try main dishes that use whole wheat pasta, brown rice, dried beans, or vegetables. · Use cooking methods that use little or no fat, such as broiling, steaming, or grilling. Use cooking spray instead of oil. If you use oil, use a monounsaturated oil, such as canola or olive oil. · Read food labels on canned, bottled, or packaged foods. Choose those with little saturated fat and no trans fat. When eating out at a restaurant · Order foods that are broiled or poached instead of fried or breaded. · Cut back on the amount of butter or margarine that you use on bread. Use small amounts of olive oil instead. · Order sauces, gravies, and salad dressings on the side, and use only a little. · When you order pasta, choose tomato sauce instead of cream sauce. · Ask for salsa with your baked potato instead of sour cream, butter, cheese, or rodriguez. Where can you learn more? Go to http://kenneth-peter.info/. Enter Z104 in the search box to learn more about \"Learning About Low-Fat Eating. \" Current as of: November 7, 2018 Content Version: 12.2 © 0412-5924 Not iT. Care instructions adapted under license by [employer] (which disclaims liability or warranty for this information). If you have questions about a medical condition or this instruction, always ask your healthcare professional. [address] any warranty or liability for your use of this information. Irritable Bowel Syndrome: Care Instructions Your Care Instructions Irritable bowel syndrome, or IBS, is a problem with the intestines that causes belly pain, bloating, gas, constipation, and diarrhea. The cause of IBS is not well known. IBS can last for many years, but it does not get worse over time or lead to serious disease. Most people can control their symptoms by changing their diet and reducing stress. Follow-up care is a key part of your treatment and safety.  Be sure to make and go to all appointments, and call your doctor if you are having problems. It's also a good idea to know your test results and keep a list of the medicines you take. How can you care for yourself at home? · For constipation: 
? Include fruits, vegetables, beans, and whole grains in your diet each day. These foods are high in fiber. ? Drink plenty of fluids. If you have kidney, heart, or liver disease and have to limit fluids, talk with your doctor before you increase the amount of fluids you drink. ? Take a fiber supplement, such as Citrucel or Metamucil, every day if needed. Read and follow all instructions on the label. ? Schedule time each day for a bowel movement. Having a daily routine may help. Take your time and do not strain when having a bowel movement. · If you often have diarrhea, limit foods and drinks that make it worse. These are different for each person but may include caffeine (found in coffee, tea, chocolate, and cola drinks), alcohol, fatty foods, gas-producing foods (such as beans, cabbage, and broccoli), some dairy products, and spicy foods. Do not eat candy or gum that contains sorbitol. · Keep a daily diary of what you eat and what symptoms you have. This may help find foods that cause you problems. · Eat slowly. Try to make mealtime relaxing. · Find ways to reduce stress. · Get at least 30 minutes of exercise on most days of the week. Exercise can help reduce tension and prevent constipation. Walking is a good choice. You also may want to do other activities, such as running, swimming, cycling, or playing tennis or team sports. When should you call for help?  
Call your doctor now or seek immediate medical care if: 
  · Your pain is different than usual or occurs with fever.  
  · You lose weight without trying, or you lose your appetite and you do not know why.  
  · Your symptoms often wake you from sleep.  
  · Your stools are black and tarlike or have streaks of blood.  
 Watch closely for changes in your health, and be sure to contact your doctor if: 
  · Your IBS symptoms get worse or begin to disrupt your day-to-day life.  
  · You become more tired than usual.  
  · Your home treatment stops working. Where can you learn more? Go to http://kenneth-peter.info/. Enter G329 in the search box to learn more about \"Irritable Bowel Syndrome: Care Instructions. \" Current as of: November 7, 2018 Content Version: 12.2 © 6896-1630 [employer]. Care instructions adapted under license by [employer] (which disclaims liability or warranty for this information). If you have questions about a medical condition or this instruction, always ask your healthcare professional. [place] [address] any warranty or liability for your use of this information.

## 2020-01-17 NOTE — PROGRESS NOTES
CC:   Chief Complaint   Patient presents with    Nausea     \"sour stomach\"cramping in stomach at night    Diarrhea     ROOM B    Fatigue       HISTORY OF PRESENT ILLNESS  Chica Milan is a 49 y.o. male. Patient complains of diffuse crampy abdominal pain, diarrhea, nausea, increased burping, stomach growling and feeling hungry even after eating, and low energy that started 6 days ago and is better today. Had cholecystectomy 3 years ago. Took Zofran that he had at home; helped. Mint tea helped also. Has had flares of similar symptoms in the past; usually lasts 1-2 days. Taking \"Bitters\" helps his symptoms. Has history of IBS. Would like referral to go to  [facility] in the [facility] for left knee pain exacerbated by playing tennis. Patient Active Problem List   Diagnosis Code    IBS (irritable bowel syndrome) K58.9    Essential hypertension I10    Hyperlipidemia E78.5    Depression with anxiety F41.8    Nephrolithiasis N20.0     Past Medical History:   Diagnosis Date    ADD (attention deficit disorder)     Cholelithiasis 9/29/14    CT in 8/14. Saw Dr. Esteban Claudio; asymptomatic gallstones    GERD (gastroesophageal reflux disease)     possibly    Hyperlipidemia     high cholesterol    Hypertension     Nephrolithiasis     KIDNEY STONES    Other recurrent depressive disorders (HCC)     generalized anxiety disorder     Allergies   Allergen Reactions    Zoloft [Sertraline] Other (comments)     Abdominal pain, nausea       Current Outpatient Medications   Medication Sig Dispense Refill    hydroCHLOROthiazide (HYDRODIURIL) 12.5 mg tablet TAKE 1 TABLET BY MOUTH EVERY DAY 30 Tab 3    simvastatin (ZOCOR) 80 mg tablet take 1 tablet by mouth every evening  Indications: excessive fat in the blood 90 Tab 3    minoxidil (ROGAINE EX) by Apply Externally route.  CANNABIDIOL, CBD, EXTRACT PO Take  by mouth.  CBD flower or drops      enalapril (VASOTEC) 20 mg tablet Take 1 Tab by mouth daily. Indications: high blood pressure 90 Tab 3         PHYSICAL EXAM  Visit Vitals  /80 (BP 1 Location: Left arm, BP Patient Position: Sitting)   Pulse 64   Temp 98.2 °F (36.8 °C) (Oral)   Resp 16   Ht 6' 1\" (1.854 m)   Wt 193 lb (87.5 kg)   SpO2 98%   BMI 25.46 kg/m²       General: Well-developed and well-nourished, no distress. HEENT:  Head normocephalic/atraumatic, no scleral icterus  Lungs:  Clear to ausculation bilaterally. Good air movement. Heart:  Regular rate and rhythm, normal S1 and S2, no murmur, gallop, or rub   Abdomen: Soft, non-distended, normal bowel sounds, no tenderness, no guarding, masses, rebound tenderness, or HSM. Extremities: No clubbing, cyanosis, or edema. ASSESSMENT AND PLAN    ICD-10-CM ICD-9-CM    1. Generalized abdominal pain R10.84 789.07    2. Nausea R11.0 787.02 ondansetron (ZOFRAN ODT) 8 mg disintegrating tablet   3. Irritable bowel syndrome with diarrhea K58.0 564.1    4. Chronic pain of left knee M25.562 719.46 REFERRAL TO PHYSICAL THERAPY    G89.29 338.29      Diagnoses and all orders for this visit:    1. Generalized abdominal pain  Now resolved. 2. Nausea  -     Refill ondansetron (ZOFRAN ODT) 8 mg disintegrating tablet; Take 1 Tab by mouth every eight (8) hours as needed for Nausea or Vomiting. 3. Irritable bowel syndrome with diarrhea    4. Chronic pain of left knee  -     REFERRAL TO PHYSICAL THERAPY      Follow-up and Dispositions    · Return if symptoms worsen or fail to improve, for Scheduled appointment on 6/17/20. I have discussed the diagnosis with the patient and the intended plan as seen in the above orders. Patient is in agreement. The patient has received an after-visit summary and questions were answered concerning future plans. I have discussed medication side effects and warnings with the patient as well.

## 2020-01-17 NOTE — PROGRESS NOTES
Pedro Sesay  Identified pt with two pt identifiers(name and ). Chief Complaint   Patient presents with    Nausea     \"sour stomach\"cramping in stomach at night    Diarrhea    Fatigue       1. Have you been to the ER, urgent care clinic since your last visit? Hospitalized since your last visit? NO    2. Have you seen or consulted any other health care providers outside of the [address] [place] [address] since your last visit? Include any pap smears or colon screening. NO    Today's provider has been notified of reason for visit, vitals and flowsheets obtained on patients.      Patient received paperwork for advance directive during previous visit but has not completed at this time     Reviewed record In preparation for visit, huddled with provider and have obtained necessary documentation      Health Maintenance Due   Topic    Shingrix Vaccine Age 50> (1 of 2)       Wt Readings from Last 3 Encounters:   20 193 lb (87.5 kg)   19 191 lb (86.6 kg)   19 192 lb 9.6 oz (87.4 kg)     Temp Readings from Last 3 Encounters:   20 98.2 °F (36.8 °C) (Oral)   19 97.6 °F (36.4 °C) (Oral)   19 98.6 °F (37 °C) (Oral)     BP Readings from Last 3 Encounters:   20 127/80   19 137/84   19 137/84     Pulse Readings from Last 3 Encounters:   20 64   19 62   19 73     Vitals:    20 1407   BP: 127/80   Pulse: 64   Resp: 16   Temp: 98.2 °F (36.8 °C)   TempSrc: Oral   SpO2: 98%   Weight: 193 lb (87.5 kg)   Height: 6' 1\" (1.854 m)   PainSc:   0 - No pain         Learning Assessment:  :     Learning Assessment 2015   PRIMARY LEARNER Patient Patient   HIGHEST LEVEL OF EDUCATION - PRIMARY LEARNER  4 YEARS OF COLLEGE 4 YEARS OF COLLEGE   BARRIERS PRIMARY LEARNER NONE NONE   CO-LEARNER CAREGIVER No No   PRIMARY LANGUAGE ENGLISH ENGLISH   LEARNER PREFERENCE PRIMARY LISTENING DEMONSTRATION   ANSWERED BY patients Patient   RELATIONSHIP SELF SELF Depression Screening:  :     3 most recent PHQ Screens 1/17/2020   Little interest or pleasure in doing things Not at all   Feeling down, depressed, irritable, or hopeless Not at all   Total Score PHQ 2 0       Fall Risk Assessment:  :     No flowsheet data found. Abuse Screening:  :     No flowsheet data found. ADL Screening:  :     ADL Assessment 6/16/2015   Feeding yourself No Help Needed   Getting from bed to chair No Help Needed   Getting dressed No Help Needed   Bathing or showering No Help Needed   Walk across the room (includes cane/walker) No Help Needed   Using the telphone No Help Needed   Taking your medications No Help Needed   Preparing meals No Help Needed   Managing money (expenses/bills) No Help Needed   Moderately strenuous housework (laundry) No Help Needed   Shopping for personal items (toiletries/medicines) No Help Needed   Shopping for groceries No Help Needed   Driving No Help Needed   Climbing a flight of stairs No Help Needed   Getting to places beyond walking distances No Help Needed                 Medication reconciliation up to date and corrected with patient at this time.

## 2020-05-30 DIAGNOSIS — I10 ESSENTIAL HYPERTENSION: ICD-10-CM

## 2020-05-31 RX ORDER — ENALAPRIL MALEATE 20 MG/1
TABLET ORAL
Qty: 90 TAB | Refills: 3 | Status: SHIPPED | OUTPATIENT
Start: 2020-05-31 | End: 2020-12-17 | Stop reason: SDUPTHER

## 2020-06-17 ENCOUNTER — VIRTUAL VISIT (OUTPATIENT)
Dept: INTERNAL MEDICINE CLINIC | Facility: CLINIC | Age: 55
End: 2020-06-17

## 2020-06-17 DIAGNOSIS — Z13.1 SCREENING FOR DIABETES MELLITUS: ICD-10-CM

## 2020-06-17 DIAGNOSIS — E78.2 MIXED HYPERLIPIDEMIA: ICD-10-CM

## 2020-06-17 DIAGNOSIS — I10 ESSENTIAL HYPERTENSION: Primary | ICD-10-CM

## 2020-06-17 DIAGNOSIS — F41.8 DEPRESSION WITH ANXIETY: ICD-10-CM

## 2020-06-17 DIAGNOSIS — Z12.5 SCREENING FOR PROSTATE CANCER: ICD-10-CM

## 2020-06-17 RX ORDER — HYDROCHLOROTHIAZIDE 12.5 MG/1
TABLET ORAL
Qty: 90 TAB | Refills: 3 | Status: SHIPPED | OUTPATIENT
Start: 2020-06-17 | End: 2021-09-02

## 2020-06-17 NOTE — PROGRESS NOTES
Luis Velazco is a 48 y.o. male who was seen by synchronous (real-time) audio-video technology on 6/17/2020. Consent: Luis Velazco, who was seen by synchronous (real-time) audio-video technology, and/or his healthcare decision maker, is aware that this patient-initiated, Telehealth encounter on 6/17/2020 is a billable service, with coverage as determined by his insurance carrier. He is aware that he may receive a bill and has provided verbal consent to proceed: Yes. Assessment & Plan:     Diagnoses and all orders for this visit:    1. Essential hypertension  Patient's reported BP of 132/97 high on DBP. Previously better controlled. Continue enalapril and HCTZ. -     Refill hydroCHLOROthiazide (HYDRODIURIL) 12.5 mg tablet; TAKE 1 TABLET BY MOUTH EVERY DAY  -     METABOLIC PANEL, COMPREHENSIVE  -     CBC WITH AUTOMATED DIFF    2. Mixed hyperlipidemia  Stable on simvastatin 80 mg nightly.  -     TSH RFX ON ABNORMAL TO FREE T4  -     LIPID PANEL    3. Depression with anxiety  Controlled on no medication. 4. Screening for prostate cancer  -     PSA SCREENING (SCREENING)    5. Screening for diabetes mellitus  -     HEMOGLOBIN A1C WITH EAG      Follow-up and Dispositions    · Return in about 6 months (around 12/17/2020), or if symptoms worsen or fail to improve, for HTN, hyperlipidemia; fasting labs at [facility] this week or next week. 712  Subjective:   Luis Velazco is a 48 y.o. male who was seen for Hypertension and Depression (follow up)    Presents for 6 month follow up evaluation. He has HTN, hyperlipidemia, generalized anxiety disorder, and IBS. No complaints today. Had PT for knees about 4 months ago then played no tennis for 2 months due to coronavirus pandemic. Mood has been good on no medications. Cardiovascular Review  The patient has hypertension and hyperlipidemia.  Denies HA's, CP, or SOB.   Taking medications as instructed, no medication side effects noted.  Diet and Lifestyle: generally follows a low fat low cholesterol diet, generally follows a low sodium diet, no formal exercise but active during the day.       Soc Hx  . Has 2 daughters (oldest is starting DO medical school in [place] this fall; younger daughter is at [employer] in kinesiology). Works in IT for [employer]; works from home. Never smoker. Drinks 1-2 beers a month. Used to play tennis 2-3 times a week for exercise.      ROS  A complete review of systems was performed and is negative except for those mentioned in the HPI. Prior to Admission medications    Medication Sig Start Date End Date Taking? Authorizing Provider   enalapril (VASOTEC) 20 mg tablet TAKE 1 TABLET BY MOUTH EVERY DAY 5/31/20  Yes Lona Watson MD   hydroCHLOROthiazide (HYDRODIURIL) 12.5 mg tablet TAKE 1 TABLET BY MOUTH EVERY DAY 2/27/20  Yes Nickolas Broderick MD   simvastatin (ZOCOR) 80 mg tablet take 1 tablet by mouth every evening  Indications: excessive fat in the blood 6/27/19  Yes Nickolas Broderick MD   minoxidil (ROGAINE EX) by Apply Externally route. Yes Provider, Historical   CANNABIDIOL, CBD, EXTRACT PO Take  by mouth. CBD flower or drops   Yes Provider, Historical     Allergies   Allergen Reactions    Zoloft [Sertraline] Other (comments)     Abdominal pain, nausea       Patient Active Problem List   Diagnosis Code    IBS (irritable bowel syndrome) K58.9    Essential hypertension I10    Hyperlipidemia E78.5    Depression with anxiety F41.8    Nephrolithiasis N20.0       Objective: There were no vitals taken for this visit.    General: alert, cooperative, no distress   Mental  status: normal mood, behavior, speech, dress, motor activity, and thought processes, able to follow commands   HENT: NCAT   Neck: no visualized mass   Resp: no respiratory distress   Neuro: no gross deficits   Skin: no discoloration or lesions of concern on visible areas   Psychiatric: normal affect, consistent with stated mood, no evidence of hallucinations Additional exam findings: none      We discussed the expected course, resolution and complications of the diagnosis(es) in detail. Medication risks, benefits, costs, interactions, and alternatives were discussed as indicated. I advised him to contact the office if his condition worsens, changes or fails to improve as anticipated. He expressed understanding with the diagnosis(es) and plan. Nimo Hooks is a 48 y.o. male who was evaluated by a video visit encounter for concerns as above. Patient identification was verified prior to start of the visit. A caregiver was present when appropriate. Due to this being a TeleHealth encounter (During [address] emergency), evaluation of the following organ systems was limited: Vitals/Constitutional/EENT/Resp/CV/GI//MS/Neuro/Skin/Heme-Lymph-Imm. Pursuant to the emergency declaration under the [facility], 1135 waiver authority and the [employer] Act, this Virtual  Visit was conducted, with patient's (and/or legal guardian's) consent, to reduce the patient's risk of exposure to COVID-19 and provide necessary medical care. THIS VISIT WAS COMPLETED VIRTUALLY USING Angella Joy. ME     Services were provided through a video synchronous discussion virtually to substitute for in-person clinic visit. Patient and provider were located at their individual homes.       Dominick Martinez MD

## 2020-06-17 NOTE — PROGRESS NOTES
Karen Parra  Identified pt with two pt identifiers(name and ). Chief Complaint   Patient presents with    Hypertension    Depression     follow up     No pain  1. Have you been to the ER, urgent care clinic since your last visit? Hospitalized since your last visit? NO    2. Have you seen or consulted any other health care providers outside of the [address] [place] [address] since your last visit? Include any pap smears or colon screening. no    Today's provider has been notified of reason for visit, vitals and flowsheets obtained on patients. Reviewed record In preparation for visit, huddled with provider and have obtained necessary documentation      Health Maintenance Due   Topic    Shingrix Vaccine Age 50> (1 of 2)    Lipid Screen        Wt Readings from Last 3 Encounters:   20 193 lb (87.5 kg)   19 191 lb (86.6 kg)   19 192 lb 9.6 oz (87.4 kg)     Temp Readings from Last 3 Encounters:   20 98.2 °F (36.8 °C) (Oral)   19 97.6 °F (36.4 °C) (Oral)   19 98.6 °F (37 °C) (Oral)     BP Readings from Last 3 Encounters:   20 127/80   19 137/84   19 137/84     Pulse Readings from Last 3 Encounters:   20 64   19 62   19 73     There were no vitals filed for this visit. Learning Assessment:  :     Learning Assessment 2015   PRIMARY LEARNER Patient Patient   HIGHEST LEVEL OF EDUCATION - PRIMARY LEARNER  4 YEARS OF COLLEGE 4 YEARS OF COLLEGE   BARRIERS PRIMARY LEARNER NONE NONE   CO-LEARNER CAREGIVER No No   PRIMARY LANGUAGE ENGLISH ENGLISH   LEARNER PREFERENCE PRIMARY LISTENING DEMONSTRATION   ANSWERED BY patients Patient   RELATIONSHIP SELF SELF       Depression Screening:  :     3 most recent PHQ Screens 2020   Little interest or pleasure in doing things Not at all   Feeling down, depressed, irritable, or hopeless Not at all   Total Score PHQ 2 0       Fall Risk Assessment:  :     No flowsheet data found.     Abuse Screening:  :     No flowsheet data found. ADL Screening:  :     ADL Assessment 6/16/2015   Feeding yourself No Help Needed   Getting from bed to chair No Help Needed   Getting dressed No Help Needed   Bathing or showering No Help Needed   Walk across the room (includes cane/walker) No Help Needed   Using the telphone No Help Needed   Taking your medications No Help Needed   Preparing meals No Help Needed   Managing money (expenses/bills) No Help Needed   Moderately strenuous housework (laundry) No Help Needed   Shopping for personal items (toiletries/medicines) No Help Needed   Shopping for groceries No Help Needed   Driving No Help Needed   Climbing a flight of stairs No Help Needed   Getting to places beyond walking distances No Help Needed                 Medication reconciliation up to date and corrected with patient at this time.

## 2020-06-30 LAB
ALBUMIN SERPL-MCNC: 4.8 G/DL (ref 3.8–4.9)
ALBUMIN/GLOB SERPL: 2.3 {RATIO} (ref 1.2–2.2)
ALP SERPL-CCNC: 75 IU/L (ref 39–117)
ALT SERPL-CCNC: 20 IU/L (ref 0–44)
AST SERPL-CCNC: 15 IU/L (ref 0–40)
BASOPHILS # BLD AUTO: 0 X10E3/UL (ref 0–0.2)
BASOPHILS NFR BLD AUTO: 0 %
BILIRUB SERPL-MCNC: 0.6 MG/DL (ref 0–1.2)
BUN SERPL-MCNC: 12 MG/DL (ref 6–24)
BUN/CREAT SERPL: 11 (ref 9–20)
CALCIUM SERPL-MCNC: 9.3 MG/DL (ref 8.7–10.2)
CHLORIDE SERPL-SCNC: 100 MMOL/L (ref 96–106)
CHOLEST SERPL-MCNC: 164 MG/DL (ref 100–199)
CO2 SERPL-SCNC: 25 MMOL/L (ref 20–29)
CREAT SERPL-MCNC: 1.08 MG/DL (ref 0.76–1.27)
EOSINOPHIL # BLD AUTO: 0 X10E3/UL (ref 0–0.4)
EOSINOPHIL NFR BLD AUTO: 1 %
ERYTHROCYTE [DISTWIDTH] IN BLOOD BY AUTOMATED COUNT: 13.3 % (ref 11.6–15.4)
EST. AVERAGE GLUCOSE BLD GHB EST-MCNC: 108 MG/DL
GLOBULIN SER CALC-MCNC: 2.1 G/DL (ref 1.5–4.5)
GLUCOSE SERPL-MCNC: 92 MG/DL (ref 65–99)
HBA1C MFR BLD: 5.4 % (ref 4.8–5.6)
HCT VFR BLD AUTO: 41.9 % (ref 37.5–51)
HDLC SERPL-MCNC: 45 MG/DL
HGB BLD-MCNC: 14.5 G/DL (ref 13–17.7)
IMM GRANULOCYTES # BLD AUTO: 0 X10E3/UL (ref 0–0.1)
IMM GRANULOCYTES NFR BLD AUTO: 0 %
LDLC SERPL CALC-MCNC: 76 MG/DL (ref 0–99)
LYMPHOCYTES # BLD AUTO: 1.6 X10E3/UL (ref 0.7–3.1)
LYMPHOCYTES NFR BLD AUTO: 23 %
MCH RBC QN AUTO: 30.1 PG (ref 26.6–33)
MCHC RBC AUTO-ENTMCNC: 34.6 G/DL (ref 31.5–35.7)
MCV RBC AUTO: 87 FL (ref 79–97)
MONOCYTES # BLD AUTO: 0.6 X10E3/UL (ref 0.1–0.9)
MONOCYTES NFR BLD AUTO: 9 %
NEUTROPHILS # BLD AUTO: 4.4 X10E3/UL (ref 1.4–7)
NEUTROPHILS NFR BLD AUTO: 67 %
PLATELET # BLD AUTO: 269 X10E3/UL (ref 150–450)
POTASSIUM SERPL-SCNC: 4 MMOL/L (ref 3.5–5.2)
PROT SERPL-MCNC: 6.9 G/DL (ref 6–8.5)
PSA SERPL-MCNC: 1.4 NG/ML (ref 0–4)
RBC # BLD AUTO: 4.82 X10E6/UL (ref 4.14–5.8)
SODIUM SERPL-SCNC: 142 MMOL/L (ref 134–144)
TRIGL SERPL-MCNC: 216 MG/DL (ref 0–149)
TSH SERPL DL<=0.005 MIU/L-ACNC: 1.6 UIU/ML (ref 0.45–4.5)
VLDLC SERPL CALC-MCNC: 43 MG/DL (ref 5–40)
WBC # BLD AUTO: 6.6 X10E3/UL (ref 3.4–10.8)

## 2020-07-02 NOTE — PROGRESS NOTES
Message sent to patient by My Chart:  Your labs all returned normal. This includes normal kidney and liver tests, blood counts, thyroid, diabetes screening test, total cholesterol, and prostate blood test (PSA). Keep up the good work!     Dr. Mariya Terrell

## 2020-07-27 ENCOUNTER — TELEPHONE (OUTPATIENT)
Dept: INTERNAL MEDICINE CLINIC | Facility: CLINIC | Age: 55
End: 2020-07-27

## 2020-07-27 NOTE — TELEPHONE ENCOUNTER
I called the patient and verified them by name and date of birth. I informed the patient on the message per Dr. Janette Lesches. The patient stated understanding and had no further questions.

## 2020-07-27 NOTE — TELEPHONE ENCOUNTER
Tell him that since he has been on simvastatin 80 mg for over a year and is doing well, he is okay. However, since simvastatin 80 mg does not interact well with some medications, including some antibiotics, Dr. Shannon Record will change him to atorvastatin 80 mg when he is due for his next refill.

## 2020-07-27 NOTE — TELEPHONE ENCOUNTER
I called the patient and verified them by name and date of birth to give lab results. The patient stated that the pharmacy keeps giving him a hand out stating that their statin should be lowered due to the adverse side effects. I asked the patient if they are taking 80mg of the Simvastatin and they said they think that is what the bottle says. I informed the patient that I would send a message to Dr. Ofe Long to see if she has any advise and they would give them a call back with more information.

## 2020-08-06 ENCOUNTER — TELEPHONE (OUTPATIENT)
Dept: INTERNAL MEDICINE CLINIC | Age: 55
End: 2020-08-06

## 2020-08-06 RX ORDER — ATORVASTATIN CALCIUM 80 MG/1
80 TABLET, FILM COATED ORAL DAILY
Qty: 90 TAB | Refills: 3 | Status: SHIPPED | OUTPATIENT
Start: 2020-08-06 | End: 2021-11-17

## 2020-11-15 PROBLEM — N13.8 BENIGN PROSTATIC HYPERPLASIA WITH URINARY OBSTRUCTION: Status: ACTIVE | Noted: 2020-11-15

## 2020-11-15 PROBLEM — N40.1 BENIGN PROSTATIC HYPERPLASIA WITH URINARY OBSTRUCTION: Status: ACTIVE | Noted: 2020-11-15

## 2020-12-17 ENCOUNTER — VIRTUAL VISIT (OUTPATIENT)
Dept: INTERNAL MEDICINE CLINIC | Age: 55
End: 2020-12-17
Payer: COMMERCIAL

## 2020-12-17 DIAGNOSIS — I10 ESSENTIAL HYPERTENSION: Primary | ICD-10-CM

## 2020-12-17 DIAGNOSIS — Z13.1 SCREENING FOR DIABETES MELLITUS: ICD-10-CM

## 2020-12-17 DIAGNOSIS — N13.8 BENIGN PROSTATIC HYPERPLASIA WITH URINARY OBSTRUCTION: ICD-10-CM

## 2020-12-17 DIAGNOSIS — N40.1 BENIGN PROSTATIC HYPERPLASIA WITH URINARY OBSTRUCTION: ICD-10-CM

## 2020-12-17 DIAGNOSIS — E78.2 MIXED HYPERLIPIDEMIA: ICD-10-CM

## 2020-12-17 DIAGNOSIS — F41.9 ANXIETY: ICD-10-CM

## 2020-12-17 DIAGNOSIS — Z12.5 SCREENING FOR PROSTATE CANCER: ICD-10-CM

## 2020-12-17 PROCEDURE — 99214 OFFICE O/P EST MOD 30 MIN: CPT | Performed by: INTERNAL MEDICINE

## 2020-12-17 RX ORDER — ALFUZOSIN HYDROCHLORIDE 10 MG/1
TABLET, EXTENDED RELEASE ORAL
COMMUNITY
Start: 2020-11-06 | End: 2022-06-13 | Stop reason: SDUPTHER

## 2020-12-17 RX ORDER — ENALAPRIL MALEATE 20 MG/1
20 TABLET ORAL 2 TIMES DAILY
Qty: 180 TAB | Refills: 3 | Status: SHIPPED | OUTPATIENT
Start: 2020-12-17 | End: 2022-03-05

## 2020-12-17 NOTE — PROGRESS NOTES
Mateus Edwards is a 48 y.o. male who was seen by synchronous (real-time) audio-video technology on 12/17/2020 for Hypertension and Cholesterol Problem        Assessment & Plan:     Diagnoses and all orders for this visit:    1. Essential hypertension  Has consistently elevated diastolic BP readings. Increase enalapril from 20 mg daily to 20 mg BID. Continue HCTZ 12.5 mg daily. Regular exercise encouraged. -     Start enalapril (VASOTEC) 20 mg tablet; Take 1 Tab by mouth two (2) times a day. -     METABOLIC PANEL, COMPREHENSIVE; Future  -     CBC WITH AUTOMATED DIFF; Future    2. Mixed hyperlipidemia  Stable on atorvastatin 80 mg nightly. -     LIPID PANEL; Future    3. Benign prostatic hyperplasia with urinary obstruction  Stable on alfuzosin. Follow up with Dr. Porsche Medina. 4. Anxiety  Controlled on no prescription medication. 5. Screening for prostate cancer  -     PSA SCREENING (SCREENING); Future    6. Screening for diabetes mellitus  -     HEMOGLOBIN A1C WITH EAG; Future      Follow-up and Dispositions    · Return in about 6 months (around 6/17/2021), or if symptoms worsen or fail to improve, for HTN, hyperlipidemia; have fasting labs 1 week prior to appointment. 712  Subjective:     Presents for 6 month follow up evaluation. He has HTN, hyperlipidemia, BPH, and anxiety disorder. No complaints today. Reports he saw Dr. Porsche Medina (Urology), diagnosed with 6 mm non-obstructing kidney stone and BPH with slow stream, started on alfuzosin.     The patient has hypertension and hyperlipidemia.  Denies HA's, CP, or SOB. Taking medications as instructed, no medication side effects noted.  Home BP with arm monitor: 143/100, 133/99, 137/93, P 65. Diet and Lifestyle: generally follows a low fat low cholesterol diet, generally follows a low sodium diet, sedentary recently.       Mood good on no prescription medications. Uses cannabinoid oil occasionally for anxiety. Soc Hx  .  Has 2 daughters (oldest is starting DO medical school in [place] this fall; younger daughter is at [employer] in exercise physiology). Works in IT for [employer]; works from home. Never smoker. Drinks 1-2 beers a month. No regular exercise lately; used to play tennis 2-3 times a week for exercise.      ROS  A complete review of systems was performed and is negative except for those mentioned in the HPI. Prior to Admission medications    Medication Sig Start Date End Date Taking? Authorizing Provider   alfuzosin SR (UROXATRAL) 10 mg SR tablet TK 1 T PO D HS 11/6/20  Yes Provider, Historical   atorvastatin (LIPITOR) 80 mg tablet Take 1 Tab by mouth daily. 8/6/20  Yes Alvin Watson MD   hydroCHLOROthiazide (HYDRODIURIL) 12.5 mg tablet TAKE 1 TABLET BY MOUTH EVERY DAY 6/17/20  Yes Lona Watson MD   enalapril (VASOTEC) 20 mg tablet TAKE 1 TABLET BY MOUTH EVERY DAY 5/31/20  Yes Irma Zee MD   minoxidil (ROGAINE EX) by Apply Externally route. Yes Provider, Historical   CANNABIDIOL, CBD, EXTRACT PO Take  by mouth.  CBD flower or drops   Yes Provider, Historical     Patient Active Problem List   Diagnosis Code    IBS (irritable bowel syndrome) K58.9    Essential hypertension I10    Hyperlipidemia E78.5    Depression with anxiety F41.8    Nephrolithiasis N20.0    Benign prostatic hyperplasia with urinary obstruction N40.1, N13.8         Objective:     Patient-Reported Vitals 6/17/2020   Patient-Reported Weight 189lb   Patient-Reported Systolic  125   Patient-Reported Diastolic 97      General: alert, cooperative, no distress   Mental  status: normal mood, behavior, speech, dress, motor activity, and thought processes, able to follow commands   HENT: NCAT   Neck: no visualized mass   Resp: no respiratory distress   Neuro: no gross deficits   Skin: no discoloration or lesions of concern on visible areas   Psychiatric: normal affect, consistent with stated mood, no evidence of hallucinations     Additional exam findings: none      We discussed the expected course, resolution and complications of the diagnosis(es) in detail. Medication risks, benefits, costs, interactions, and alternatives were discussed as indicated. I advised him to contact the office if his condition worsens, changes or fails to improve as anticipated. He expressed understanding with the diagnosis(es) and plan. THIS VISIT WAS COMPLETED VIRTUALLY USING DOXY. Ant Swann, who was evaluated through a patient-initiated, synchronous (real-time) audio-video encounter, and/or his healthcare decision maker, is aware that it is a billable service, with coverage as determined by his insurance carrier. He provided verbal consent to proceed: Yes, and patient identification was verified. It was conducted pursuant to the emergency declaration under the [address] [place] [address] authority and the [employer] General Act. A caregiver was present when appropriate. Ability to conduct physical exam was limited. I was at home. The patient was at home.       Gamaliel Clemente MD

## 2020-12-17 NOTE — PROGRESS NOTES
[employer] [facility]  Identified pt with two pt identifiers(name and ). Chief Complaint   Patient presents with    Hypertension    Cholesterol Problem       Reviewed record In preparation for visit and have obtained necessary documentation. Has info on advanced directive but has not filled them out. 1. Have you been to the ER, urgent care clinic or hospitalized since your last visit? No     2. Have you seen or consulted any other health care providers outside of the [address] [place] [address] since your last visit? Include any pap smears or colon screening. No    Vitals reviewed with provider. Health Maintenance reviewed: There are no preventive care reminders to display for this patient. Wt Readings from Last 3 Encounters:   20 193 lb (87.5 kg)   19 191 lb (86.6 kg)   19 192 lb 9.6 oz (87.4 kg)        Temp Readings from Last 3 Encounters:   20 98.2 °F (36.8 °C) (Oral)   19 97.6 °F (36.4 °C) (Oral)   19 98.6 °F (37 °C) (Oral)        BP Readings from Last 3 Encounters:   20 127/80   19 137/84   19 137/84        Pulse Readings from Last 3 Encounters:   20 64   19 62   19 73      There were no vitals filed for this visit. Learning Assessment:   :       Learning Assessment 2015   PRIMARY LEARNER Patient Patient   HIGHEST LEVEL OF EDUCATION - PRIMARY LEARNER  4 YEARS OF COLLEGE 4 YEARS OF COLLEGE   BARRIERS PRIMARY LEARNER NONE NONE   CO-LEARNER CAREGIVER No No   PRIMARY LANGUAGE ENGLISH ENGLISH   LEARNER PREFERENCE PRIMARY LISTENING DEMONSTRATION   ANSWERED BY patients Patient   RELATIONSHIP SELF SELF        Depression Screening:   :       3 most recent PHQ Screens 2020   Little interest or pleasure in doing things Not at all   Feeling down, depressed, irritable, or hopeless Not at all   Total Score PHQ 2 0        Fall Risk Assessment:   :     No flowsheet data found.      Abuse Screening:   :     No flowsheet data found.      ADL Screening:   :       ADL Assessment 6/16/2015   Feeding yourself No Help Needed   Getting from bed to chair No Help Needed   Getting dressed No Help Needed   Bathing or showering No Help Needed   Walk across the room (includes cane/walker) No Help Needed   Using the telphone No Help Needed   Taking your medications No Help Needed   Preparing meals No Help Needed   Managing money (expenses/bills) No Help Needed   Moderately strenuous housework (laundry) No Help Needed   Shopping for personal items (toiletries/medicines) No Help Needed   Shopping for groceries No Help Needed   Driving No Help Needed   Climbing a flight of stairs No Help Needed   Getting to places beyond walking distances No Help Needed

## 2021-07-28 ENCOUNTER — OFFICE VISIT (OUTPATIENT)
Dept: INTERNAL MEDICINE CLINIC | Age: 56
End: 2021-07-28
Payer: COMMERCIAL

## 2021-07-28 VITALS
BODY MASS INDEX: 24.52 KG/M2 | RESPIRATION RATE: 16 BRPM | TEMPERATURE: 98.1 F | HEIGHT: 73 IN | WEIGHT: 185 LBS | SYSTOLIC BLOOD PRESSURE: 151 MMHG | DIASTOLIC BLOOD PRESSURE: 95 MMHG | HEART RATE: 56 BPM | OXYGEN SATURATION: 98 %

## 2021-07-28 DIAGNOSIS — N40.0 BENIGN PROSTATIC HYPERPLASIA WITHOUT LOWER URINARY TRACT SYMPTOMS: ICD-10-CM

## 2021-07-28 DIAGNOSIS — I10 ESSENTIAL HYPERTENSION: Primary | ICD-10-CM

## 2021-07-28 DIAGNOSIS — F41.9 ANXIETY: ICD-10-CM

## 2021-07-28 DIAGNOSIS — N20.0 NEPHROLITHIASIS: ICD-10-CM

## 2021-07-28 DIAGNOSIS — E78.2 MIXED HYPERLIPIDEMIA: ICD-10-CM

## 2021-07-28 PROCEDURE — 99214 OFFICE O/P EST MOD 30 MIN: CPT | Performed by: INTERNAL MEDICINE

## 2021-07-28 NOTE — PROGRESS NOTES
CC:   Chief Complaint   Patient presents with    Hypertension     Room 3A //    Cholesterol Problem       HISTORY OF PRESENT ILLNESS  Vira Lopez is a 55 y.o. male. Presents for 6 month follow up evaluation. He has HTN, hyperlipidemia, BPH, and anxiety disorder. Since last clinic visit, having problems with painful kidney stones from both kidneys. Had lithotripsy on 5/17/21 for distal L ureteral stone and R ureteral stent placement on 6/15/21. Denies HA's, CP, SOB, or leg swelling. Occasional dizziness when he bends down or sits up. Taking medications as instructed, no medication side effects noted.  Home BP with arm monitor: has not checked recently. Diet and Lifestyle: generally follows a low fat low cholesterol diet, generally follows a low sodium diet, sedentary recently.       Anxiety controlled on no prescription medications. Uses cannabinoid oil occasionally for anxiety. Soc Hx  . Has 2 daughters (oldest is in [employer] medical school in [place]; younger daughter is at [employer] in exercise physiology). 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 Never smoker. Drinks 1-2 beers a month. No regular exercise lately; used to play tennis 2-3 times a week for exercise. Health Maintenance  COVID-19 vaccine: had 2/2 vaccines    ROS  A complete review of systems was performed and is negative except for those mentioned in the HPI. Patient Active Problem List   Diagnosis Code    Essential hypertension I10    Hyperlipidemia E78.5    Nephrolithiasis N20.0    Benign prostatic hyperplasia with urinary obstruction N40.1, N13.8    Anxiety F41.9     Past Medical History:   Diagnosis Date    ADD (attention deficit disorder)     Cholelithiasis 9/29/14    CT in 8/14.  Saw Dr. Jordan Puga; asymptomatic gallstones    Depression with anxiety 6/16/2015 6/27/13: PHQ-9 score of 13 (moderate depression), started on Wellbutrin in 6/13     GERD (gastroesophageal reflux disease)     possibly    Hyperlipidemia     high cholesterol    Hypertension     IBS (irritable bowel syndrome) 9/29/2014    Nephrolithiasis     KIDNEY STONES    Other recurrent depressive disorders (HCC)     generalized anxiety disorder     Allergies   Allergen Reactions    Zoloft [Sertraline] Other (comments)     Abdominal pain, nausea       Current Outpatient Medications   Medication Sig Dispense Refill    alfuzosin SR (UROXATRAL) 10 mg SR tablet TK 1 T PO D HS      enalapril (VASOTEC) 20 mg tablet Take 1 Tab by mouth two (2) times a day. 180 Tab 3    atorvastatin (LIPITOR) 80 mg tablet Take 1 Tab by mouth daily. 90 Tab 3    hydroCHLOROthiazide (HYDRODIURIL) 12.5 mg tablet TAKE 1 TABLET BY MOUTH EVERY DAY 90 Tab 3    minoxidil (ROGAINE EX) by Apply Externally route.  CANNABIDIOL, CBD, EXTRACT PO Take  by mouth. CBD flower or drops           PHYSICAL EXAM  Visit Vitals  BP (!) 151/95 (BP 1 Location: Left upper arm, BP Patient Position: Sitting, BP Cuff Size: Adult)   Pulse (!) 56   Temp 98.1 °F (36.7 °C) (Oral)   Resp 16   Ht 6' 1\" (1.854 m)   Wt 185 lb (83.9 kg)   SpO2 98%   BMI 24.41 kg/m²       General: Well-developed and well-nourished, no distress. HEENT:  Head normocephalic/atraumatic, no scleral icterus  Neck: Supple. No JVD, lymphadenopathy, or thyromegaly. Lungs:  Clear to ausculation bilaterally. Good air movement. Heart:  Regular rate and rhythm, normal S1 and S2, no murmur, gallop, or rub  Extremities: No clubbing, cyanosis, or edema. 2+ pedal pulses. Neurological: Alert and oriented. Psychiatric: Normal mood and affect. Behavior is normal.         ASSESSMENT AND PLAN    ICD-10-CM ICD-9-CM    1. Essential hypertension  I10 401.9    2. Nephrolithiasis  N20.0 592.0    3. Mixed hyperlipidemia  E78.2 272.2    4. Benign prostatic hyperplasia without lower urinary tract symptoms  N40.0 600.00    5. Anxiety  F41.9 300.00      Diagnoses and all orders for this visit:    1. Essential hypertension  Not controlled. Instructed him to check his home BP 3 times a week for 2 weeks and send message on BP readings through My Chart. If BP usually above 140/90 at home, plan to increase HCTZ form 12.5 to 25 mg daily. Continue enalapril 20 mg twice daily; consider changing to losartan to decrease risk of kidney stones. 2. Nephrolithiasis  Drink plenty of water. Continue following with Urology. 3. Mixed hyperlipidemia  Stable on atorvastatin 80 mg daily. 4. Benign prostatic hyperplasia without lower urinary tract symptoms  Controlled on alfuzosin. 5. Anxiety  Controlled on no prescription medications. He forgot to have fasting labs 1 week before this appointment. Lab orders reprinted (CMP, CBC, A1c, FLP, screening PSA). Follow-up and Dispositions    · Return in about 3 months (around 10/28/2021), or if symptoms worsen or fail to improve, for HTN. I have discussed the diagnosis with the patient and the intended plan as seen in the above orders. Patient is in agreement. The patient has received an after-visit summary and questions were answered concerning future plans. I have discussed medication side effects and warnings with the patient as well.

## 2021-07-28 NOTE — PROGRESS NOTES
Cesar Arnold  Identified pt with two pt identifiers(name and ). Chief Complaint   Patient presents with    Hypertension     Room 3A //    Cholesterol Problem       Reviewed record In preparation for visit and have obtained necessary documentation. 1. Have you been to the ER, urgent care clinic or hospitalized since your last visit? No     2. Have you seen or consulted any other health care providers outside of the [address] [place] [address] since your last visit? Include any pap smears or colon screening. No    Patient does not have an advance directive. Vitals reviewed with provider.     Health Maintenance reviewed:     Health Maintenance Due   Topic    COVID-19 Vaccine (1)    Lipid Screen           Wt Readings from Last 3 Encounters:   21 185 lb (83.9 kg)   20 193 lb (87.5 kg)   19 191 lb (86.6 kg)        Temp Readings from Last 3 Encounters:   21 98.1 °F (36.7 °C) (Oral)   20 98.2 °F (36.8 °C) (Oral)   19 97.6 °F (36.4 °C) (Oral)        BP Readings from Last 3 Encounters:   21 (!) 151/95   20 127/80   19 137/84        Pulse Readings from Last 3 Encounters:   21 (!) 56   20 64   19 62        Vitals:    21 0755 21 0802   BP: (!) 141/93 (!) 151/95   Pulse: (!) 56    Resp: 16    Temp: 98.1 °F (36.7 °C)    TempSrc: Oral    SpO2: 98%    Weight: 185 lb (83.9 kg)    Height: 6' 1\" (1.854 m)    PainSc:   0 - No pain           Learning Assessment:   :       Learning Assessment 2015   PRIMARY LEARNER Patient Patient   HIGHEST LEVEL OF EDUCATION - PRIMARY LEARNER  4 YEARS OF COLLEGE 4 YEARS OF COLLEGE   BARRIERS PRIMARY LEARNER NONE NONE   CO-LEARNER CAREGIVER No No   PRIMARY LANGUAGE ENGLISH ENGLISH   LEARNER PREFERENCE PRIMARY LISTENING DEMONSTRATION   ANSWERED BY patients Patient   RELATIONSHIP SELF SELF        Depression Screening:   :       3 most recent PHQ Screens 2021   Little interest or pleasure in doing things Not at all   Feeling down, depressed, irritable, or hopeless Not at all   Total Score PHQ 2 0        Fall Risk Assessment:   :     No flowsheet data found. Abuse Screening:   :     No flowsheet data found.      ADL Screening:   :       ADL Assessment 6/16/2015   Feeding yourself No Help Needed   Getting from bed to chair No Help Needed   Getting dressed No Help Needed   Bathing or showering No Help Needed   Walk across the room (includes cane/walker) No Help Needed   Using the telphone No Help Needed   Taking your medications No Help Needed   Preparing meals No Help Needed   Managing money (expenses/bills) No Help Needed   Moderately strenuous housework (laundry) No Help Needed   Shopping for personal items (toiletries/medicines) No Help Needed   Shopping for groceries No Help Needed   Driving No Help Needed   Climbing a flight of stairs No Help Needed   Getting to places beyond walking distances No Help Needed

## 2021-07-28 NOTE — PATIENT INSTRUCTIONS
High Blood Pressure: Care Instructions  Overview     It's normal for blood pressure to go up and down throughout the day. But if it stays up, you have high blood pressure. Another name for high blood pressure is hypertension. Despite what a lot of people think, high blood pressure usually doesn't cause headaches or make you feel dizzy or lightheaded. It usually has no symptoms. But it does increase your risk of stroke, heart attack, and other problems. You and your doctor will talk about your risks of these problems based on your blood pressure. Your doctor will give you a goal for your blood pressure. Your goal will be based on your health and your age. Lifestyle changes, such as eating healthy and being active, are always important to help lower blood pressure. You might also take medicine to reach your blood pressure goal.  Follow-up care is a key part of your treatment and safety. Be sure to make and go to all appointments, and call your doctor if you are having problems. It's also a good idea to know your test results and keep a list of the medicines you take. How can you care for yourself at home? Medical treatment  · If you stop taking your medicine, your blood pressure will go back up. You may take one or more types of medicine to lower your blood pressure. Be safe with medicines. Take your medicine exactly as prescribed. Call your doctor if you think you are having a problem with your medicine. · Talk to your doctor before you start taking aspirin every day. Aspirin can help certain people lower their risk of a heart attack or stroke. But taking aspirin isn't right for everyone, because it can cause serious bleeding. · See your doctor regularly. You may need to see the doctor more often at first or until your blood pressure comes down. · If you are taking blood pressure medicine, talk to your doctor before you take decongestants or anti-inflammatory medicine, such as ibuprofen.  Some of these medicines can raise blood pressure. · Learn how to check your blood pressure at home. Lifestyle changes  · Stay at a healthy weight. This is especially important if you put on weight around the waist. Losing even 10 pounds can help you lower your blood pressure. · If your doctor recommends it, get more exercise. Walking is a good choice. Bit by bit, increase the amount you walk every day. Try for at least 30 minutes on most days of the week. You also may want to swim, bike, or do other activities. · Avoid or limit alcohol. Talk to your doctor about whether you can drink any alcohol. · Try to limit how much sodium you eat to less than 2,300 milligrams (mg) a day. Your doctor may ask you to try to eat less than 1,500 mg a day. · Eat plenty of fruits (such as bananas and oranges), vegetables, legumes, whole grains, and low-fat dairy products. · Lower the amount of saturated fat in your diet. Saturated fat is found in animal products such as milk, cheese, and meat. Limiting these foods may help you lose weight and also lower your risk for heart disease. · Do not smoke. Smoking increases your risk for heart attack and stroke. If you need help quitting, talk to your doctor about stop-smoking programs and medicines. These can increase your chances of quitting for good. When should you call for help? Call  911 anytime you think you may need emergency care. This may mean having symptoms that suggest that your blood pressure is causing a serious heart or blood vessel problem. Your blood pressure may be over 180/120. For example, call 911 if:    · You have symptoms of a heart attack. These may include:  ? Chest pain or pressure, or a strange feeling in the chest.  ? Sweating. ? Shortness of breath. ? Nausea or vomiting. ? Pain, pressure, or a strange feeling in the back, neck, jaw, or upper belly or in one or both shoulders or arms. ? Lightheadedness or sudden weakness.   ? A fast or irregular heartbeat.     · You have symptoms of a stroke. These may include:  ? Sudden numbness, tingling, weakness, or loss of movement in your face, arm, or leg, especially on only one side of your body. ? Sudden vision changes. ? Sudden trouble speaking. ? Sudden confusion or trouble understanding simple statements. ? Sudden problems with walking or balance. ? A sudden, severe headache that is different from past headaches.     · You have severe back or belly pain. Do not wait until your blood pressure comes down on its own. Get help right away. Call your doctor now or seek immediate care if:    · Your blood pressure is much higher than normal (such as 180/120 or higher), but you don't have symptoms.     · You think high blood pressure is causing symptoms, such as:  ? Severe headache.  ? Blurry vision. Watch closely for changes in your health, and be sure to contact your doctor if:    · Your blood pressure measures higher than your doctor recommends at least 2 times. That means the top number is higher or the bottom number is higher, or both.     · You think you may be having side effects from your blood pressure medicine. Where can you learn more? Go to http://www.gray.com/  Enter [id number] in the search box to learn more about \"High Blood Pressure: Care Instructions. \"  Current as of: August 31, 2020               Content Version: 12.8  © 2006-2021 [employer]. Care instructions adapted under license by [employer] (which disclaims liability or warranty for this information). If you have questions about a medical condition or this instruction, always ask your healthcare professional. [place] [address] any warranty or liability for your use of this information.

## 2021-08-18 ENCOUNTER — TELEPHONE (OUTPATIENT)
Dept: INTERNAL MEDICINE CLINIC | Age: 56
End: 2021-08-18

## 2021-08-18 NOTE — TELEPHONE ENCOUNTER
----- Message from Canary Aschoff sent at 8/18/2021 10:44 AM EDT -----  Regarding: MD Shirley/Telephone  General Message/Vendor Calls    Caller's first and last name: Self      Reason for call: Ointment for eyelid mites. Callback required yes/no and why: Yes      Best contact number(s): [phone]      Details to clarify the request: Pt says he's been using an ointment for eyelid mites for years, only has to use about once a year. Cannot remember what it is called, was originally prescribed by another pcp. Asking if pcp could send to local [facility] on file, or if he needs an appt.       Canary Aschoff

## 2021-08-19 ENCOUNTER — VIRTUAL VISIT (OUTPATIENT)
Dept: INTERNAL MEDICINE CLINIC | Age: 56
End: 2021-08-19
Payer: COMMERCIAL

## 2021-08-19 VITALS — BODY MASS INDEX: 24.41 KG/M2 | HEIGHT: 73 IN

## 2021-08-19 DIAGNOSIS — H01.005 BLEPHARITIS OF LEFT LOWER EYELID, UNSPECIFIED TYPE: Primary | ICD-10-CM

## 2021-08-19 PROCEDURE — 99213 OFFICE O/P EST LOW 20 MIN: CPT | Performed by: PHYSICIAN ASSISTANT

## 2021-08-19 RX ORDER — ERYTHROMYCIN 5 MG/G
OINTMENT OPHTHALMIC
Qty: 1 TUBE | Refills: 0 | Status: SHIPPED | OUTPATIENT
Start: 2021-08-19 | End: 2021-11-05

## 2021-08-19 NOTE — PROGRESS NOTES
Aundrea   Identified pt with two pt identifiers(name and ). Chief Complaint   Patient presents with    Eyelid Inflammation       Reviewed record In preparation for visit and have obtained necessary documentation. 1. Have you been to the ER, urgent care clinic or hospitalized since your last visit? No     2. Have you seen or consulted any other health care providers outside of the [address] [place] [address] since your last visit? Include any pap smears or colon screening. No    Vitals reviewed with provider. Health Maintenance reviewed:     Health Maintenance Due   Topic    COVID-19 Vaccine (1)    Lipid Screen           Wt Readings from Last 3 Encounters:   21 185 lb (83.9 kg)   20 193 lb (87.5 kg)   19 191 lb (86.6 kg)        Temp Readings from Last 3 Encounters:   21 98.1 °F (36.7 °C) (Oral)   20 98.2 °F (36.8 °C) (Oral)   19 97.6 °F (36.4 °C) (Oral)        BP Readings from Last 3 Encounters:   21 (!) 151/95   20 127/80   19 137/84        Pulse Readings from Last 3 Encounters:   21 (!) 56   20 64   19 62        Vitals:    21 0700   Height: 6' 1\" (1.854 m)   PainSc:   0 - No pain          Learning Assessment:   :       Learning Assessment 2015   PRIMARY LEARNER Patient Patient   HIGHEST LEVEL OF EDUCATION - PRIMARY LEARNER  4 YEARS OF COLLEGE 4 YEARS OF COLLEGE   BARRIERS PRIMARY LEARNER NONE NONE   CO-LEARNER CAREGIVER No No   PRIMARY LANGUAGE ENGLISH ENGLISH   LEARNER PREFERENCE PRIMARY LISTENING DEMONSTRATION   ANSWERED BY patients Patient   RELATIONSHIP SELF SELF        Depression Screening:   :       3 most recent PHQ Screens 2021   Little interest or pleasure in doing things Not at all   Feeling down, depressed, irritable, or hopeless Not at all   Total Score PHQ 2 0        Fall Risk Assessment:   :     No flowsheet data found. Abuse Screening:   :     No flowsheet data found.      ADL Screening:   :       ADL Assessment 8/19/2021   Feeding yourself No Help Needed   Getting from bed to chair No Help Needed   Getting dressed No Help Needed   Bathing or showering No Help Needed   Walk across the room (includes cane/walker) No Help Needed   Using the telphone No Help Needed   Taking your medications No Help Needed   Preparing meals No Help Needed   Managing money (expenses/bills) No Help Needed   Moderately strenuous housework (laundry) No Help Needed   Shopping for personal items (toiletries/medicines) No Help Needed   Shopping for groceries No Help Needed   Driving No Help Needed   Climbing a flight of stairs No Help Needed   Getting to places beyond walking distances No Help Needed

## 2021-08-19 NOTE — PROGRESS NOTES
Aundrea Velez is a 55 y.o. male who was seen by synchronous (real-time) audio-video technology on 8/19/2021 for Eyelid Inflammation        Assessment & Plan:   Diagnoses and all orders for this visit:    1. Blepharitis of left lower eyelid, unspecified type  -     erythromycin (ILOTYCIN) ophthalmic ointment; Apply 1 cm strip to affected eye up to 6 times a day, do not wash out afterwards  Eye appears to be improving and is difficult to ascertain over doxy but patient describes blepharitis and potential stye, resolving with warm compresses. Will add on Erythromycin ointment to continue treatment so no recurrence occurs. Patient has used before and shows understanding of treatment. The complexity of medical decision making for this visit is LOW     I spent at least 15 minutes on this visit with this established patient. 712  Subjective:   Patient seen over audio and visual technology for 5 day history of gradually resolving L lower eyelid redness, itching and pain. He has history of the same in the same eye. He had previously used ilotycin ointment but is now out. He has been using the tea bag treatment for the past 2 days as hot compresses which has worked well. He has no blurred vision, fever or other issues this AM.       Prior to Admission medications    Medication Sig Start Date End Date Taking? Authorizing Provider   erythromycin (ILOTYCIN) ophthalmic ointment Apply 1 cm strip to affected eye up to 6 times a day, do not wash out afterwards 8/19/21  Yes Christen Hearn PA-C   alfuzosin SR (UROXATRAL) 10 mg SR tablet TK 1 T PO D HS 11/6/20  Yes Provider, Historical   enalapril (VASOTEC) 20 mg tablet Take 1 Tab by mouth two (2) times a day. 12/17/20  Yes Victoria Watson MD   atorvastatin (LIPITOR) 80 mg tablet Take 1 Tab by mouth daily.  8/6/20  Yes Yovanny Blair MD   hydroCHLOROthiazide (HYDRODIURIL) 12.5 mg tablet TAKE 1 TABLET BY MOUTH EVERY DAY 6/17/20  Yes Yovanny Blair MD   minoxidil (ROGAINE EX) by Apply Externally route. Yes Provider, Historical   CANNABIDIOL, CBD, EXTRACT PO Take  by mouth. CBD flower or drops   Yes Provider, Historical     Patient Active Problem List   Diagnosis Code    Essential hypertension I10    Hyperlipidemia E78.5    Nephrolithiasis N20.0    Benign prostatic hyperplasia with urinary obstruction N40.1, N13.8    Anxiety F41.9     Patient Active Problem List    Diagnosis Date Noted    Anxiety 12/17/2020    Benign prostatic hyperplasia with urinary obstruction 11/15/2020    Essential hypertension 06/16/2015    Hyperlipidemia 06/16/2015    Nephrolithiasis 06/16/2015     Current Outpatient Medications   Medication Sig Dispense Refill    erythromycin (ILOTYCIN) ophthalmic ointment Apply 1 cm strip to affected eye up to 6 times a day, do not wash out afterwards 1 Tube 0    alfuzosin SR (UROXATRAL) 10 mg SR tablet TK 1 T PO D HS      enalapril (VASOTEC) 20 mg tablet Take 1 Tab by mouth two (2) times a day. 180 Tab 3    atorvastatin (LIPITOR) 80 mg tablet Take 1 Tab by mouth daily. 90 Tab 3    hydroCHLOROthiazide (HYDRODIURIL) 12.5 mg tablet TAKE 1 TABLET BY MOUTH EVERY DAY 90 Tab 3    minoxidil (ROGAINE EX) by Apply Externally route.  CANNABIDIOL, CBD, EXTRACT PO Take  by mouth. CBD flower or drops       Allergies   Allergen Reactions    Zoloft [Sertraline] Other (comments)     Abdominal pain, nausea     Past Medical History:   Diagnosis Date    ADD (attention deficit disorder)     Cholelithiasis 9/29/14    CT in 8/14.  Saw Dr. Anali Benson; asymptomatic gallstones    Depression with anxiety 6/16/2015 6/27/13: PHQ-9 score of 13 (moderate depression), started on Wellbutrin in 6/13     GERD (gastroesophageal reflux disease)     possibly    Hyperlipidemia     high cholesterol    Hypertension     IBS (irritable bowel syndrome) 9/29/2014    Nephrolithiasis     KIDNEY STONES    Other recurrent depressive disorders ([facility] [address]     generalized anxiety disorder     Past Surgical History:   Procedure Laterality Date    HX CHOLECYSTECTOMY  12/05/2016    lap tonya    HX COLONOSCOPY  8/18/14    Grade 1 int hemorrhoids; repeat in 10 yrs (Dr. Reyna Acevedo)    HX LITHOTRIPSY      HX OTHER SURGICAL      Kidney stone surgery with stents     Family History   Problem Relation Age of Onset    Hypertension Mother    Gennette Persian Lupus Mother     Diabetes Mother     High Cholesterol Father     Hypertension Father      Social History     Tobacco Use    Smoking status: Never Smoker    Smokeless tobacco: Never Used   Substance Use Topics    Alcohol use: Yes     Comment: weekends       Review of Systems   Constitutional: Negative for chills and fever. HENT: Negative for congestion, ear pain, nosebleeds, sinus pain and sore throat. Eyes: Positive for redness. Negative for blurred vision, double vision, photophobia, pain and discharge. Respiratory: Negative for cough and shortness of breath. Cardiovascular: Negative for chest pain. Gastrointestinal: Negative for diarrhea, nausea and vomiting. Musculoskeletal: Negative for myalgias. Skin: Negative for rash. Neurological: Negative for headaches. Objective:     Patient-Reported Vitals 8/19/2021   Patient-Reported Weight 187.5   Patient-Reported Systolic  474   Patient-Reported Diastolic 81      General: alert, cooperative, no distress   Mental  status: normal mood, behavior, speech, dress, motor activity, and thought processes, able to follow commands   HENT: NCAT   Neck: no visualized mass   Resp: no respiratory distress   Neuro: no gross deficits   Skin: no discoloration or lesions of concern on visible areas   Psychiatric: normal affect, consistent with stated mood, no evidence of hallucinations     Additional exam findings: We discussed the expected course, resolution and complications of the diagnosis(es) in detail. Medication risks, benefits, costs, interactions, and alternatives were discussed as indicated.   I advised him to contact the office if his condition worsens, changes or fails to improve as anticipated. He expressed understanding with the diagnosis(es) and plan. Mervin Rainey, was evaluated through a synchronous (real-time) audio-video encounter. The patient (or guardian if applicable) is aware that this is a billable service. Verbal consent to proceed has been obtained within the past 12 months. The visit was conducted pursuant to the emergency declaration under the [address] [place] [address] authority and the [employer] Act. Patient identification was verified, and a caregiver was present when appropriate. The patient was located in a state where the provider was credentialed to provide care.     Mariana Nye PA-C

## 2021-09-02 DIAGNOSIS — I10 ESSENTIAL HYPERTENSION: ICD-10-CM

## 2021-09-02 RX ORDER — HYDROCHLOROTHIAZIDE 12.5 MG/1
TABLET ORAL
Qty: 90 TABLET | Refills: 3 | Status: SHIPPED | OUTPATIENT
Start: 2021-09-02 | End: 2021-11-05

## 2021-11-05 ENCOUNTER — NURSE TRIAGE (OUTPATIENT)
Dept: OTHER | Facility: CLINIC | Age: 56
End: 2021-11-05

## 2021-11-05 ENCOUNTER — OFFICE VISIT (OUTPATIENT)
Dept: INTERNAL MEDICINE CLINIC | Age: 56
End: 2021-11-05
Payer: COMMERCIAL

## 2021-11-05 VITALS
RESPIRATION RATE: 12 BRPM | SYSTOLIC BLOOD PRESSURE: 130 MMHG | WEIGHT: 184.35 LBS | OXYGEN SATURATION: 98 % | TEMPERATURE: 98.9 F | HEIGHT: 73 IN | DIASTOLIC BLOOD PRESSURE: 80 MMHG | BODY MASS INDEX: 24.43 KG/M2 | HEART RATE: 89 BPM

## 2021-11-05 DIAGNOSIS — E78.2 MIXED HYPERLIPIDEMIA: ICD-10-CM

## 2021-11-05 DIAGNOSIS — I25.10 CORONARY ARTERY DISEASE INVOLVING NATIVE CORONARY ARTERY OF NATIVE HEART WITHOUT ANGINA PECTORIS: ICD-10-CM

## 2021-11-05 DIAGNOSIS — I10 ESSENTIAL HYPERTENSION: Primary | ICD-10-CM

## 2021-11-05 PROCEDURE — 99214 OFFICE O/P EST MOD 30 MIN: CPT | Performed by: PHYSICIAN ASSISTANT

## 2021-11-05 RX ORDER — AMLODIPINE BESYLATE 2.5 MG/1
2.5 TABLET ORAL DAILY
Qty: 30 TABLET | Refills: 5 | Status: SHIPPED | OUTPATIENT
Start: 2021-11-05 | End: 2022-04-20 | Stop reason: SDUPTHER

## 2021-11-05 NOTE — PATIENT INSTRUCTIONS
Hold Hydrochlorothiazide. Start 2.5 mg Amlodipine every day. Check blood pressure daily and write them down. Follow up in 6 weeks

## 2021-11-05 NOTE — PROGRESS NOTES
Sesar Bobby  Identified pt with two pt identifiers(name and ). Chief Complaint   Patient presents with    Hypertension    Back Pain    Headache       Reviewed record In preparation for visit and have obtained necessary documentation. 1. Have you been to the ER, urgent care clinic or hospitalized since your last visit? No     2. Have you seen or consulted any other health care providers outside of the [address] [place] [address] since your last visit? Include any pap smears or colon screening. No    Vitals reviewed with provider.     Health Maintenance reviewed:     Health Maintenance Due   Topic    Flu Vaccine (1)          Wt Readings from Last 3 Encounters:   21 184 lb 5.6 oz (83.6 kg)   21 185 lb (83.9 kg)   20 193 lb (87.5 kg)        Temp Readings from Last 3 Encounters:   21 98.9 °F (37.2 °C) (Oral)   21 98.1 °F (36.7 °C) (Oral)   20 98.2 °F (36.8 °C) (Oral)        BP Readings from Last 3 Encounters:   21 (!) 149/89   21 (!) 151/95   20 127/80        Pulse Readings from Last 3 Encounters:   21 89   21 (!) 56   20 64        Vitals:    21 1334   BP: (!) 149/89   Pulse: 89   Resp: 12   Temp: 98.9 °F (37.2 °C)   TempSrc: Oral   SpO2: 98%   Weight: 184 lb 5.6 oz (83.6 kg)   Height: 6' 1\" (1.854 m)   PainSc:   0 - No pain          Learning Assessment:   :       Learning Assessment 2015   PRIMARY LEARNER Patient Patient   HIGHEST LEVEL OF EDUCATION - PRIMARY LEARNER  4 YEARS OF COLLEGE 4 YEARS OF COLLEGE   BARRIERS PRIMARY LEARNER NONE NONE   CO-LEARNER CAREGIVER No No   PRIMARY LANGUAGE ENGLISH ENGLISH   LEARNER PREFERENCE PRIMARY LISTENING DEMONSTRATION   ANSWERED BY patients Patient   RELATIONSHIP SELF SELF        Depression Screening:   :       3 most recent PHQ Screens 2021   Little interest or pleasure in doing things Not at all   Feeling down, depressed, irritable, or hopeless Not at all   Total Score PHQ 2 0        Fall Risk Assessment:   :     No flowsheet data found. Abuse Screening:   :     No flowsheet data found.      ADL Screening:   :       ADL Assessment 8/19/2021   Feeding yourself No Help Needed   Getting from bed to chair No Help Needed   Getting dressed No Help Needed   Bathing or showering No Help Needed   Walk across the room (includes cane/walker) No Help Needed   Using the telphone No Help Needed   Taking your medications No Help Needed   Preparing meals No Help Needed   Managing money (expenses/bills) No Help Needed   Moderately strenuous housework (laundry) No Help Needed   Shopping for personal items (toiletries/medicines) No Help Needed   Shopping for groceries No Help Needed   Driving No Help Needed   Climbing a flight of stairs No Help Needed   Getting to places beyond walking distances No Help Needed

## 2021-11-05 NOTE — PROGRESS NOTES
Yue Bains is a 55y.o. year old male seen in clinic today for   Chief Complaint   Patient presents with    Hypertension    Back Pain    Headache       he is here today to follow up for HTN. He was told to follow up in 3 months for HTN. Had been checking at first and noted blood pressures were normal. Began waking each day with HA's and recently some mild vertigo. Began checking his blood pressure again and it has been running high. He has been compliant with his meds. Enalopril 20 mg BID, Alfuzosin nightly and hctz AM.  He notes since he started taking his HCTZ he had the worst year of kidney stones in his life. He noted some mild back pain, which he thought may have been related to his kidneys recently. No blood in urine. Does not feel like kidney stones. He would like to do a calcium score of his heart. Several friends have begun to have heart attacks. He has had no chest pains but has a hx of HLD/htn.     he specifically denies any CP, SOB, HA. Dizziness, fevers, chills, N/V/D, urinary symptoms or other bowel changes. Current Outpatient Medications on File Prior to Visit   Medication Sig Dispense Refill    hydroCHLOROthiazide (HYDRODIURIL) 12.5 mg tablet TAKE 1 TABLET BY MOUTH DAILY 90 Tablet 3    alfuzosin SR (UROXATRAL) 10 mg SR tablet TK 1 T PO D HS      enalapril (VASOTEC) 20 mg tablet Take 1 Tab by mouth two (2) times a day. 180 Tab 3    atorvastatin (LIPITOR) 80 mg tablet Take 1 Tab by mouth daily. 90 Tab 3    minoxidil (ROGAINE EX) by Apply Externally route.  CANNABIDIOL, CBD, EXTRACT PO Take  by mouth. CBD flower or drops      erythromycin (ILOTYCIN) ophthalmic ointment Apply 1 cm strip to affected eye up to 6 times a day, do not wash out afterwards 1 Tube 0     No current facility-administered medications on file prior to visit.          Allergies   Allergen Reactions    Zoloft [Sertraline] Other (comments)     Abdominal pain, nausea     Past Medical History:   Diagnosis Date  ADD (attention deficit disorder)     Cholelithiasis 9/29/14    CT in 8/14. Saw Dr. Evita Salmon; asymptomatic gallstones    Depression with anxiety 6/16/2015 6/27/13: PHQ-9 score of 13 (moderate depression), started on Wellbutrin in 6/13     GERD (gastroesophageal reflux disease)     possibly    Hyperlipidemia     high cholesterol    Hypertension     IBS (irritable bowel syndrome) 9/29/2014    Nephrolithiasis     KIDNEY STONES    Other recurrent depressive disorders ([facility] [address]     generalized anxiety disorder      Past Surgical History:   Procedure Laterality Date    HX CHOLECYSTECTOMY  12/05/2016    lap tonya    HX COLONOSCOPY  8/18/14    Grade 1 int hemorrhoids; repeat in 10 yrs (Dr. Cayla Carvajal)    HX LITHOTRIPSY      HX OTHER SURGICAL      Kidney stone surgery with stents        Family History   Problem Relation Age of Onset    Hypertension Mother    Lashanda Prieto Lupus Mother     Diabetes Mother     High Cholesterol Father     Hypertension Father         Social History     Socioeconomic History    Marital status:      Spouse name: Not on file    Number of children: Not on file    Years of education: Not on file    Highest education level: Not on file   Occupational History    Not on file   Tobacco Use    Smoking status: Never Smoker    Smokeless tobacco: Never Used   Vaping Use    Vaping Use: Never used   Substance and Sexual Activity    Alcohol use: Yes     Comment: weekends    Drug use: Yes     Types: Marijuana    Sexual activity: Yes     Partners: Female     Birth control/protection: None   Other Topics Concern    Not on file   Social History Narrative    Not on file     Social Determinants of Health     Financial Resource Strain:     Difficulty of Paying Living Expenses: Not on file   Food Insecurity:     Worried About Running Out of Food in the Last Year: Not on file    Miguelangel of Food in the Last Year: Not on file   Transportation Needs:     Lack of Transportation (Medical):  Not on file    Lack of Transportation (Non-Medical): Not on file   Physical Activity:     Days of Exercise per Week: Not on file    Minutes of Exercise per Session: Not on file   Stress:     Feeling of Stress : Not on file   Social Connections:     Frequency of Communication with Friends and Family: Not on file    Frequency of Social Gatherings with Friends and Family: Not on file    Attends Yazdanism Services: Not on file    Active Member of [address] [place] [address] or Organizations: Not on file    Attends Club or Organization Meetings: Not on file    Marital Status: Not on file   Intimate Partner Violence:     Fear of Current or Ex-Partner: Not on file    Emotionally Abused: Not on file    Physically Abused: Not on file    Sexually Abused: Not on file   Housing Stability:     Unable to Pay for Housing in the Last Year: Not on file    Number of [place] in the Last Year: Not on file    Unstable Housing in the Last Year: Not on file           Visit Vitals  BP (!) 149/89 (BP 1 Location: Left upper arm, BP Patient Position: Sitting)   Pulse 89   Temp 98.9 °F (37.2 °C) (Oral)   Resp 12   Ht 6' 1\" (1.854 m)   Wt 184 lb 5.6 oz (83.6 kg)   SpO2 98%   BMI 24.32 kg/m²       Review of Systems   Constitutional: Negative for chills, fever, malaise/fatigue and weight loss. Respiratory: Negative for cough, shortness of breath and wheezing. Cardiovascular: Negative for chest pain, palpitations and leg swelling. Gastrointestinal: Negative for abdominal pain, blood in stool, constipation, diarrhea, heartburn, melena, nausea and vomiting. Genitourinary: Negative for dysuria and frequency. Musculoskeletal: Positive for back pain. Negative for myalgias. Skin: Negative for rash. Neurological: Positive for dizziness and headaches. Negative for weakness. Endo/Heme/Allergies: Does not bruise/bleed easily. Psychiatric/Behavioral: Negative for depression. All other systems reviewed and are negative.      Physical Exam  Vitals and nursing note reviewed. Constitutional:       Appearance: Normal appearance. He is normal weight. HENT:      Head: Normocephalic and atraumatic. Right Ear: External ear normal.      Left Ear: External ear normal.      Nose: Nose normal.      Mouth/Throat:      Mouth: Mucous membranes are moist.      Pharynx: Oropharynx is clear. No oropharyngeal exudate or posterior oropharyngeal erythema. Eyes:      Conjunctiva/sclera: Conjunctivae normal.      Pupils: Pupils are equal, round, and reactive to light. Neck:      Vascular: No carotid bruit. Cardiovascular:      Rate and Rhythm: Normal rate and regular rhythm. Pulses: Normal pulses. Heart sounds: Normal heart sounds. No murmur heard. Pulmonary:      Effort: Pulmonary effort is normal.      Breath sounds: Normal breath sounds. No stridor. No wheezing, rhonchi or rales. Abdominal:      General: Abdomen is flat. Musculoskeletal:         General: Normal range of motion. Cervical back: Normal range of motion and neck supple. No rigidity. Right lower leg: No edema. Left lower leg: No edema. Skin:     General: Skin is dry. Capillary Refill: Capillary refill takes less than 2 seconds. Neurological:      General: No focal deficit present. Mental Status: He is alert and oriented to person, place, and time. Mental status is at baseline. Psychiatric:         Mood and Affect: Mood normal.          No results found for this or any previous visit (from the past 24 hour(s)). ASSESSMENT AND PLAN  Diagnoses and all orders for this visit:    1. Essential hypertension  -     CT HEART W/O CONT WITH CALCIUM; Future  -     amLODIPine (NORVASC) 2.5 mg tablet; Take 1 Tablet by mouth daily. 2. Mixed hyperlipidemia  -     CT HEART W/O CONT WITH CALCIUM; Future    Will adjust BP meds. Still running high. At home readings have been 140/90. This /100. With symptoms.  Will d/c hctz due to kidney stones and link there. Switch to amlodipine 2.5, begin and check, will increase to 5 mg as needed. Follow up in 6 weeks. I have discussed the diagnosis with the patient and the intended plan as seen in the above orders. Patient is in agreement. The patient has received an after-visit summary and questions were answered concerning future plans. I have discussed medication side effects and warnings with the patient as well.     Katie Sin PA-C

## 2021-11-05 NOTE — TELEPHONE ENCOUNTER
Reason for Disposition   Patient wants to be seen    Answer Assessment - Initial Assessment Questions  1. BLOOD PRESSURE: \"What is the blood pressure? \" \"Did you take at least two measurements 5 minutes apart?\"      143/101    2. ONSET: \"When did you take your blood pressure? \"      Has been going on about 2 weeks    3. HOW: \"How did you obtain the blood pressure? \" (e.g., visiting nurse, automatic home BP monitor)      Automatic upper arm cuffs    4. HISTORY: \"Do you have a history of high blood pressure? \"      Yes    5. MEDICATIONS: Nevaeh Ing you taking any medications for blood pressure? \" \"Have you missed any doses recently? \"      Has not missed any doses recently    6. OTHER SYMPTOMS: \"Do you have any symptoms? \" (e.g., headache, chest pain, blurred vision, difficulty breathing, weakness)      Intermittent dizziness, headache, and low back pain, intermittent cramping sensation in left shoulder- not present now    7. PREGNANCY: \"Is there any chance you are pregnant? \" \"When was your last menstrual period? \"      n/a    Protocols used: HIGH BLOOD PRESSURE-ADULT-[place]    Received call from Isidoro Cockayne at [facility] with Red Flag Complaint. Brief description of triage: See above assessment    Triage indicates for patient to Be seen today/ [facility] as back up    Care advice provided, patient verbalizes understanding; denies any other questions or concerns; instructed to call back for any new or worsening symptoms. Writer provided warm transfer to [facility] at [facility] for appointment scheduling. Attention Provider: Thank you for allowing me to participate in the care of your patient. The patient was connected to triage in response to information provided to the [facility]. Please do not respond through this encounter as the response is not directed to a shared pool.

## 2021-11-16 ENCOUNTER — APPOINTMENT (OUTPATIENT)
Dept: CT IMAGING | Age: 56
End: 2021-11-16
Attending: PHYSICIAN ASSISTANT

## 2021-11-24 ENCOUNTER — APPOINTMENT (OUTPATIENT)
Dept: CT IMAGING | Age: 56
End: 2021-11-24
Attending: PHYSICIAN ASSISTANT

## 2021-12-02 ENCOUNTER — HOSPITAL ENCOUNTER (OUTPATIENT)
Dept: CT IMAGING | Age: 56
Discharge: HOME | End: 2021-12-02
Attending: PHYSICIAN ASSISTANT
Payer: SELF-PAY

## 2021-12-02 DIAGNOSIS — I10 ESSENTIAL HYPERTENSION: ICD-10-CM

## 2021-12-02 DIAGNOSIS — E78.2 MIXED HYPERLIPIDEMIA: ICD-10-CM

## 2021-12-02 PROCEDURE — 75571 CT HRT W/O DYE W/CA TEST: CPT

## 2021-12-03 NOTE — PROGRESS NOTES
Eda Carvalho, your calcium score for your heart showed mild disease. This is not an overall worrisome reading but I would recommend going and having a stress test done because of it.  I will send in a cardiology referral for you to use and schedule a stress test with Dr. Sakina Burk

## 2022-03-19 PROBLEM — F41.9 ANXIETY: Status: ACTIVE | Noted: 2020-12-17

## 2022-03-20 PROBLEM — N40.1 BENIGN PROSTATIC HYPERPLASIA WITH URINARY OBSTRUCTION: Status: ACTIVE | Noted: 2020-11-15

## 2022-03-20 PROBLEM — N13.8 BENIGN PROSTATIC HYPERPLASIA WITH URINARY OBSTRUCTION: Status: ACTIVE | Noted: 2020-11-15

## 2022-04-20 DIAGNOSIS — I10 ESSENTIAL HYPERTENSION: ICD-10-CM

## 2022-04-20 RX ORDER — AMLODIPINE BESYLATE 2.5 MG/1
2.5 TABLET ORAL DAILY
Qty: 90 TABLET | Refills: 1 | Status: SHIPPED | OUTPATIENT
Start: 2022-04-20

## 2022-04-20 NOTE — TELEPHONE ENCOUNTER
Requested Prescriptions     Pending Prescriptions Disp Refills    amLODIPine (NORVASC) 2.5 mg tablet 30 Tablet 5     Sig: Take 1 Tablet by mouth daily. Patient wanted to see if he could get this script as a 90 day supply because [place] be traveling [place].

## 2022-04-20 NOTE — TELEPHONE ENCOUNTER
PCP: Binta Medeiros MD     Last appt: 11/5/2021   No future appointments. Requested Prescriptions     Pending Prescriptions Disp Refills    amLODIPine (NORVASC) 2.5 mg tablet 90 Tablet 1     Sig: Take 1 Tablet by mouth daily.

## 2022-12-29 DIAGNOSIS — N13.8 BENIGN PROSTATIC HYPERPLASIA WITH URINARY OBSTRUCTION: ICD-10-CM

## 2022-12-29 DIAGNOSIS — N40.1 BENIGN PROSTATIC HYPERPLASIA WITH URINARY OBSTRUCTION: ICD-10-CM

## 2022-12-29 RX ORDER — ALFUZOSIN HYDROCHLORIDE 10 MG/1
TABLET, EXTENDED RELEASE ORAL
Qty: 90 TABLET | Refills: 1 | Status: SHIPPED | OUTPATIENT
Start: 2022-12-29

## 2022-12-29 NOTE — TELEPHONE ENCOUNTER
Requested Prescriptions     Pending Prescriptions Disp Refills    alfuzosin SR (UROXATRAL) 10 mg SR tablet 90 Tablet 0

## 2022-12-29 NOTE — TELEPHONE ENCOUNTER
PCP: Timothy Crespo MD     Last appt: 11/5/2021     Future Appointments   Date Time Provider Geovanna Ferrell   3/24/2023  1:40 PM Timothy Crespo MD [facility] BS AMB          Requested Prescriptions     Pending Prescriptions Disp Refills    alfuzosin SR (UROXATRAL) 10 mg SR tablet 90 Tablet 0     Sig: TAKE 1 TABLET BY MOUTH DAILY AFTER A MEAL

## 2023-02-16 ENCOUNTER — OFFICE VISIT (OUTPATIENT)
Dept: URGENT CARE | Age: 58
End: 2023-02-16
Payer: COMMERCIAL

## 2023-02-16 VITALS
WEIGHT: 185 LBS | OXYGEN SATURATION: 99 % | RESPIRATION RATE: 18 BRPM | SYSTOLIC BLOOD PRESSURE: 161 MMHG | TEMPERATURE: 99.9 F | BODY MASS INDEX: 24.41 KG/M2 | DIASTOLIC BLOOD PRESSURE: 101 MMHG | HEART RATE: 86 BPM

## 2023-02-16 DIAGNOSIS — R50.9 FEVER, UNSPECIFIED FEVER CAUSE: ICD-10-CM

## 2023-02-16 DIAGNOSIS — I10 ESSENTIAL HYPERTENSION, BENIGN: ICD-10-CM

## 2023-02-16 DIAGNOSIS — U07.1 CLINICAL DIAGNOSIS OF COVID-19: Primary | ICD-10-CM

## 2023-02-16 LAB
EXP DATE SOLUTION: ABNORMAL
EXP DATE SWAB: ABNORMAL
LOT NUMBER SOLUTION: ABNORMAL
LOT NUMBER SWAB: ABNORMAL
SARS-COV-2 RNA POC: POSITIVE

## 2023-02-16 PROCEDURE — 3077F SYST BP >= 140 MM HG: CPT | Performed by: NURSE PRACTITIONER

## 2023-02-16 PROCEDURE — 87635 SARS-COV-2 COVID-19 AMP PRB: CPT | Performed by: NURSE PRACTITIONER

## 2023-02-16 PROCEDURE — 3080F DIAST BP >= 90 MM HG: CPT | Performed by: NURSE PRACTITIONER

## 2023-02-16 PROCEDURE — 99203 OFFICE O/P NEW LOW 30 MIN: CPT | Performed by: NURSE PRACTITIONER

## 2023-02-16 NOTE — PROGRESS NOTES
The history is provided by the patient. Cold Symptoms  The history is provided by the Patient. This is a new problem. The current episode started yesterday. The problem occurs constantly. The problem has not changed since onset. The cough is Non-productive. There has been a fever of 100 - 100.9 F. The fever has been present for 1 - 2 days. Associated symptoms include headaches, rhinorrhea and sore throat. Pertinent negatives include no chest pain, no myalgias, no shortness of breath, no wheezing and no nausea. He has tried cough syrup (Ibuprofen) for the symptoms. The treatment provided mild relief. Risk factors: no known exposure. He is not a smoker. Past Medical History:   Diagnosis Date    ADD (attention deficit disorder)     Cholelithiasis 9/29/14    CT in 8/14.  Saw Dr. Chris Morton; asymptomatic gallstones    Depression with anxiety 6/16/2015 6/27/13: PHQ-9 score of 13 (moderate depression), started on Wellbutrin in 6/13     GERD (gastroesophageal reflux disease)     possibly    Hyperlipidemia     high cholesterol    Hypertension     IBS (irritable bowel syndrome) 9/29/2014    Nephrolithiasis     KIDNEY STONES    Other [facility]     generalized anxiety disorder        Past Surgical History:   Procedure Laterality Date    HX CHOLECYSTECTOMY  12/05/2016    lap tonya    HX COLONOSCOPY  8/18/14    Grade 1 int hemorrhoids; repeat in 10 yrs (Dr. Teresa Mayer)    HX LITHOTRIPSY      HX OTHER SURGICAL      Kidney stone surgery with stents         Family History   Problem Relation Age of Onset    Hypertension Mother     Lupus Mother     Diabetes Mother     High Cholesterol Father     Hypertension Father         Social History     Socioeconomic History    Marital status:      Spouse name: Not on file    Number of children: Not on file    Years of education: Not on file    Highest education level: Not on file   Occupational History    Not on file   Tobacco Use    Smoking status: Never    Smokeless tobacco: Never   Vaping Use    Vaping Use: Never used   Substance and Sexual Activity    Alcohol use: Yes     Comment: weekends    Drug use: Yes     Types: Marijuana    Sexual activity: Yes     Partners: Female     Birth control/protection: None   Other Topics Concern    Not on file   Social History Narrative    Not on file     Social Determinants of Health     Financial Resource Strain: Not on file   Food Insecurity: Not on file   Transportation Needs: Not on file   Physical Activity: Not on file   Stress: Not on file   Social Connections: Not on file   Intimate Partner Violence: Not on file   Housing Stability: Not on file                ALLERGIES: Zoloft [sertraline]    Review of Systems   Constitutional:  Positive for activity change, fatigue and fever. Negative for appetite change. HENT:  Positive for congestion, postnasal drip, rhinorrhea and sore throat. Negative for sinus pressure and sinus pain. Respiratory:  Positive for cough. Negative for chest tightness, shortness of breath and wheezing. Cardiovascular:  Negative for chest pain and palpitations. Gastrointestinal:  Negative for abdominal pain and nausea. Musculoskeletal:  Negative for myalgias. Neurological:  Positive for headaches. Vitals:    02/16/23 1501 02/16/23 1502   BP: (!) 175/100 (!) 161/101   Pulse: 86    Resp: 18    Temp: 99.9 °F (37.7 °C)    SpO2: 99%    Weight: 185 lb (83.9 kg)        Physical Exam  Constitutional:       Appearance: Normal appearance. He is ill-appearing. HENT:      Right Ear: Tympanic membrane normal.      Left Ear: Tympanic membrane normal.      Nose: No congestion or rhinorrhea. Mouth/Throat:      Pharynx: No oropharyngeal exudate or posterior oropharyngeal erythema. Eyes:      Pupils: Pupils are equal, round, and reactive to light. Cardiovascular:      Rate and Rhythm: Normal rate and regular rhythm. Heart sounds: Normal heart sounds.    Pulmonary:      Breath sounds: Normal breath sounds. Lymphadenopathy:      Cervical: No cervical adenopathy. Skin:     General: Skin is dry. Neurological:      Mental Status: He is alert and oriented to person, place, and time. Psychiatric:         Mood and Affect: Mood normal.         Behavior: Behavior normal.       MDM     Differential Diagnosis; Clinical Impression; Plan:     (U07.1) Clinical diagnosis of COVID-19  (primary encounter diagnosis)  (R50.9) Fever, unspecified fever cause  (I10) Essential hypertension, benign        Procedures    1. Clinical diagnosis of COVID-19  Rest, increase fluids. Medication: Paxlovid 300/100 BID for 5 days   Follow up for new or worsening symptoms     2. Fever, unspecified fever cause  Rotate Tylenol and or Ibuprofen as needed   - AMB POC COVID-19 COV    3. Essential hypertension, benign  Take medication for BP daily. Check BP daily.   Keep log of BP, and present to PCP  If >140/90 follow up with PCP

## 2023-02-16 NOTE — PATIENT INSTRUCTIONS
1. Clinical diagnosis of COVID-19  Rest, increase fluids. Medication: Paxlovid 300/100 BID for 5 days   Follow up for new or worsening symptoms     2. Fever, unspecified fever cause  Rotate Tylenol and or Ibuprofen as needed   - AMB POC COVID-19 COV    3. Essential hypertension, benign  Take medication for BP daily. Check BP daily.   Keep log of BP, and present to PCP  If >140/90 follow up with PCP

## 2023-03-24 ENCOUNTER — OFFICE VISIT (OUTPATIENT)
Dept: INTERNAL MEDICINE CLINIC | Age: 58
End: 2023-03-24

## 2023-03-24 VITALS
TEMPERATURE: 98 F | SYSTOLIC BLOOD PRESSURE: 150 MMHG | HEIGHT: 73 IN | BODY MASS INDEX: 24.25 KG/M2 | HEART RATE: 76 BPM | DIASTOLIC BLOOD PRESSURE: 80 MMHG | OXYGEN SATURATION: 98 % | RESPIRATION RATE: 16 BRPM | WEIGHT: 183 LBS

## 2023-03-24 DIAGNOSIS — N40.1 BENIGN PROSTATIC HYPERPLASIA WITH URINARY OBSTRUCTION: ICD-10-CM

## 2023-03-24 DIAGNOSIS — I10 ESSENTIAL HYPERTENSION: Primary | ICD-10-CM

## 2023-03-24 DIAGNOSIS — Z12.5 SCREENING FOR PROSTATE CANCER: ICD-10-CM

## 2023-03-24 DIAGNOSIS — N13.8 BENIGN PROSTATIC HYPERPLASIA WITH URINARY OBSTRUCTION: ICD-10-CM

## 2023-03-24 DIAGNOSIS — E78.2 MIXED HYPERLIPIDEMIA: ICD-10-CM

## 2023-03-24 DIAGNOSIS — Z13.1 SCREENING FOR DIABETES MELLITUS: ICD-10-CM

## 2023-03-24 NOTE — PATIENT INSTRUCTIONS
High Blood Pressure: Care Instructions  Overview     It's normal for blood pressure to go up and down throughout the day. But if it stays up, you have high blood pressure. Another name for high blood pressure is hypertension. Despite what a lot of people think, high blood pressure usually doesn't cause headaches or make you feel dizzy or lightheaded. It usually has no symptoms. But it does increase your risk of stroke, heart attack, and other problems. You and your doctor will talk about your risks of these problems based on your blood pressure. Your doctor will give you a goal for your blood pressure. Your goal will be based on your health and your age. Lifestyle changes, such as eating healthy and being active, are always important to help lower blood pressure. You might also take medicine to reach your blood pressure goal.  Follow-up care is a key part of your treatment and safety. Be sure to make and go to all appointments, and call your doctor if you are having problems. It's also a good idea to know your test results and keep a list of the medicines you take. How can you care for yourself at home? Medical treatment  If you stop taking your medicine, your blood pressure will go back up. You may take one or more types of medicine to lower your blood pressure. Be safe with medicines. Take your medicine exactly as prescribed. Call your doctor if you think you are having a problem with your medicine. Talk to your doctor before you start taking aspirin every day. Aspirin can help certain people lower their risk of a heart attack or stroke. But taking aspirin isn't right for everyone, because it can cause serious bleeding. See your doctor regularly. You may need to see the doctor more often at first or until your blood pressure comes down. If you are taking blood pressure medicine, talk to your doctor before you take decongestants or anti-inflammatory medicine, such as ibuprofen.  Some of these medicines can raise blood pressure. Learn how to check your blood pressure at home. Lifestyle changes  Stay at a healthy weight. This is especially important if you put on weight around the waist. Losing even 10 pounds can help you lower your blood pressure. If your doctor recommends it, get more exercise. Walking is a good choice. Bit by bit, increase the amount you walk every day. Try for at least 30 minutes on most days of the week. You also may want to swim, bike, or do other activities. Avoid or limit alcohol. Talk to your doctor about whether you can drink any alcohol. Try to limit how much sodium you eat to less than 2,300 milligrams (mg) a day. Your doctor may ask you to try to eat less than 1,500 mg a day. Eat plenty of fruits (such as bananas and oranges), vegetables, legumes, whole grains, and low-fat dairy products. Lower the amount of saturated fat in your diet. Saturated fat is found in animal products such as milk, cheese, and meat. Limiting these foods may help you lose weight and also lower your risk for heart disease. Do not smoke. Smoking increases your risk for heart attack and stroke. If you need help quitting, talk to your doctor about stop-smoking programs and medicines. These can increase your chances of quitting for good. When should you call for help? Call 911  anytime you think you may need emergency care. This may mean having symptoms that suggest that your blood pressure is causing a serious heart or blood vessel problem. Your blood pressure may be over 180/120. For example, call 911 if:    You have symptoms of a heart attack. These may include:  Chest pain or pressure, or a strange feeling in the chest.  Sweating. Shortness of breath. Nausea or vomiting. Pain, pressure, or a strange feeling in the back, neck, jaw, or upper belly or in one or both shoulders or arms. Lightheadedness or sudden weakness. A fast or irregular heartbeat. You have symptoms of a stroke.  These may include:  Sudden numbness, tingling, weakness, or loss of movement in your face, arm, or leg, especially on only one side of your body. Sudden vision changes. Sudden trouble speaking. Sudden confusion or trouble understanding simple statements. Sudden problems with walking or balance. A sudden, severe headache that is different from past headaches. You have severe back or belly pain. Do not wait until your blood pressure comes down on its own. Get help right away. Call your doctor now or seek immediate care if:    Your blood pressure is much higher than normal (such as 180/120 or higher), but you don't have symptoms. You think high blood pressure is causing symptoms, such as:  Severe headache. Blurry vision. Watch closely for changes in your health, and be sure to contact your doctor if:    Your blood pressure measures higher than your doctor recommends at least 2 times. That means the top number is higher or the bottom number is higher, or both. You think you may be having side effects from your blood pressure medicine. Where can you learn more? Go to http://www.gray.com/  Enter [id number] in the search box to learn more about \"High Blood Pressure: Care Instructions. \"  Current as of: October 6, 2021               Content Version: 13.4  © 2006-2022 [employer]. Care instructions adapted under license by [employer] (which disclaims liability or warranty for this information). If you have questions about a medical condition or this instruction, always ask your healthcare professional. [place] [address] any warranty or liability for your use of this information.

## 2023-03-24 NOTE — PROGRESS NOTES
CC:   Chief Complaint   Patient presents with    Complete Physical       HISTORY OF PRESENT ILLNESS  Loyda Villela is a 65 y.o. male. Presents for physical exam. Last in clinic on 11/5/21. He has HTN, hyperlipidemia, BPH, anxiety disorder, and history of kidney stones. Had COVID 3 weeks ago. Took Paxlovid. No complaints today. Denies HA's, CP, SOB, cough, sore throat, dizziness, heart palpitations, leg swelling, abdominal pain, diarrhea, or constipation. Home BP monitoring: not checked recently. Taking  enalapril 20 mg twice daily. Stopped taking amlodipine 2.5 mg daily on his own; took in am and says it made him dizzy. Anxiety controlled on no prescription medications. Uses cannabinoid oil for anxiety. Soc Hx  . Has 2 daughters (oldest is in DO medical school in [place]; younger daughter is working as physical therapist). Works in IT for [employer]. Never smoker. Drinks 1-2 beers a month. Exercises by walking and playing tennis. Flu vaccine: had  COVID-19 vaccine: had 3/4 vaccines     Patient Active Problem List   Diagnosis Code    Essential hypertension I10    Hyperlipidemia E78.5    Nephrolithiasis N20.0    Benign prostatic hyperplasia with urinary obstruction N40.1, N13.8    Anxiety F41.9     Past Medical History:   Diagnosis Date    ADD (attention deficit disorder)     Cholelithiasis 9/29/14    CT in 8/14.  Saw Dr. Charles Ferreira; asymptomatic gallstones    Depression with anxiety 6/16/2015 6/27/13: PHQ-9 score of 13 (moderate depression), started on Wellbutrin in 6/13     GERD (gastroesophageal reflux disease)     possibly    Hyperlipidemia     high cholesterol    Hypertension     IBS (irritable bowel syndrome) 9/29/2014    Nephrolithiasis     KIDNEY STONES    Other recurrent depressive disorders (HCC)     generalized anxiety disorder     Allergies   Allergen Reactions    Zoloft [Sertraline] Other (comments)     Abdominal pain, nausea       Current Outpatient Medications   Medication Sig Dispense Refill    alfuzosin SR (UROXATRAL) 10 mg SR tablet TAKE 1 TABLET BY MOUTH DAILY AFTER A MEAL 90 Tablet 1    atorvastatin (LIPITOR) 80 mg tablet TAKE 1 TABLET BY MOUTH EVERY DAY 90 Tablet 2    enalapril (VASOTEC) 20 mg tablet TAKE 1 TABLET BY MOUTH TWICE DAILY 180 Tablet 1    minoxidil (ROGAINE EX) by Apply Externally route. CANNABIDIOL, CBD, EXTRACT PO Take  by mouth. CBD flower or drops      amLODIPine (NORVASC) 2.5 mg tablet Take 1 Tablet by mouth daily. (Patient not taking: Reported on 3/24/2023) 90 Tablet 1         PHYSICAL EXAM  Visit Vitals  BP (!) 150/80 (BP 1 Location: Right upper arm)   Pulse 76   Temp 98 °F (36.7 °C) (Oral)   Resp 16   Ht 6' 1\" (1.854 m)   Wt 183 lb (83 kg)   SpO2 98%   BMI 24.14 kg/m²       General: Well-developed and well-nourished, no distress. HEENT:  Head normocephalic/atraumatic, no scleral icterus  Neck: Supple. No carotid bruits, JVD, lymphadenopathy, or thyromegaly. Lungs:  Clear to ausculation bilaterally. Good air movement. Heart:  Regular rate and rhythm, normal S1 and S2, no murmur, gallop, or rub  Abdomen: Soft, non-distended, normal bowel sounds, no tenderness, no guarding, masses, rebound tenderness, or HSM. Extremities: No clubbing, cyanosis, or edema. 2+ pedal pulses. Neurological: Alert and oriented. Psychiatric: Normal mood and affect. Behavior is normal.         ASSESSMENT AND PLAN    ICD-10-CM ICD-9-CM    1. Essential hypertension  S66 833.6 METABOLIC PANEL, COMPREHENSIVE      CBC WITH AUTOMATED DIFF      2. Mixed hyperlipidemia  E78.2 272.2 LIPID PANEL      3. Benign prostatic hyperplasia with urinary obstruction  N40.1 600.01     N13.8 599.69       4. Screening for diabetes mellitus  Z13.1 V77.1 HEMOGLOBIN A1C WITH EAG      5. Screening for prostate cancer  Z12.5 V76.44 PSA SCREENING (SCREENING)        Diagnoses and all orders for this visit:    1. Essential hypertension  -     METABOLIC PANEL, COMPREHENSIVE;  Future  -     CBC WITH AUTOMATED DIFF; Future    2. Mixed hyperlipidemia  -     LIPID PANEL; Future    3. Benign prostatic hyperplasia with urinary obstruction    4. Screening for diabetes mellitus  -     HEMOGLOBIN A1C WITH EAG; Future    5. Screening for prostate cancer  -     PSA SCREENING (SCREENING); Future    Normal exam.   HTN uncontrolled. Instructed to restart amlodipine 2.5 mg but take at night. Continue enalapril 20 mg twice daily. Have fasting labs within the next 2 weeks. Follow-up and Dispositions    Return in about 3 months (around 6/24/2023), or if symptoms worsen or fail to improve, for HTN. I have discussed the diagnosis with the patient and the intended plan as seen in the above orders. Patient is in agreement. The patient has received an after-visit summary and questions were answered concerning future plans. I have discussed medication side effects and warnings with the patient as well.

## 2023-03-24 NOTE — PROGRESS NOTES
Danny Montes  Identified pt with two pt identifiers(name and ). Chief Complaint   Patient presents with    Complete Physical       Reviewed record In preparation for visit and have obtained necessary documentation. 1. Have you been to the ER, urgent care clinic or hospitalized since your last visit? No     2. Have you seen or consulted any other health care providers outside of the [address] [place] [address] since your last visit? Include any pap smears or colon screening. No    Vitals reviewed with provider.     Health Maintenance reviewed:     Health Maintenance Due   Topic    COVID-19 Vaccine (3 - Booster for Moderna series)    Flu Vaccine (1)    Lipid Screen     Depression Screen           Wt Readings from Last 3 Encounters:   23 183 lb (83 kg)   23 185 lb (83.9 kg)   21 184 lb 5.6 oz (83.6 kg)        Temp Readings from Last 3 Encounters:   23 98 °F (36.7 °C) (Oral)   23 99.9 °F (37.7 °C)   21 98.9 °F (37.2 °C) (Oral)        BP Readings from Last 3 Encounters:   23 (!) 150/80   23 (!) 161/101   21 130/80        Pulse Readings from Last 3 Encounters:   23 76   23 86   21 89        Vitals:    23 1353 23 1355   BP: (!) 136/92 (!) 150/80   Pulse: 76    Resp: 16    Temp: 98 °F (36.7 °C)    TempSrc: Oral    SpO2: 98%    Weight: 183 lb (83 kg)    Height: 6' 1\" (1.854 m)    PainSc:   0 - No pain           Learning Assessment:   :       Learning Assessment 2015   PRIMARY LEARNER Patient Patient   HIGHEST LEVEL OF EDUCATION - PRIMARY LEARNER  4 YEARS OF COLLEGE 4 YEARS OF COLLEGE   BARRIERS PRIMARY LEARNER NONE NONE   CO-LEARNER CAREGIVER No No   PRIMARY LANGUAGE ENGLISH ENGLISH   LEARNER PREFERENCE PRIMARY LISTENING DEMONSTRATION   ANSWERED BY patients Patient   RELATIONSHIP SELF SELF        Depression Screening:   :       3 most recent PHQ Screens 3/24/2023   Little interest or pleasure in doing things Not at all Feeling down, depressed, irritable, or hopeless Not at all   Total Score PHQ 2 0        Fall Risk Assessment:   :     No flowsheet data found. Abuse Screening:   :       Abuse Screening Questionnaire 3/24/2023   Do you ever feel afraid of your partner? N   Are you in a relationship with someone who physically or mentally threatens you? N   Is it safe for you to go home?  Y        ADL Screening:   :       ADL Assessment 3/24/2023   Feeding yourself No Help Needed   Getting from bed to chair No Help Needed   Getting dressed No Help Needed   Bathing or showering No Help Needed   Walk across the room (includes cane/walker) No Help Needed   Using the telphone No Help Needed   Taking your medications No Help Needed   Preparing meals No Help Needed   Managing money (expenses/bills) No Help Needed   Moderately strenuous housework (laundry) No Help Needed   Shopping for personal items (toiletries/medicines) No Help Needed   Shopping for groceries No Help Needed   Driving No Help Needed   Climbing a flight of stairs No Help Needed   Getting to places beyond walking distances No Help Needed

## 2023-04-21 DIAGNOSIS — I10 ESSENTIAL HYPERTENSION: ICD-10-CM

## 2023-04-21 RX ORDER — AMLODIPINE BESYLATE 2.5 MG/1
2.5 TABLET ORAL DAILY
Qty: 90 TABLET | Refills: 1 | Status: SHIPPED | OUTPATIENT
Start: 2023-04-21

## 2023-04-21 RX ORDER — ENALAPRIL MALEATE 20 MG/1
20 TABLET ORAL 2 TIMES DAILY
Qty: 180 TABLET | Refills: 1 | Status: SHIPPED | OUTPATIENT
Start: 2023-04-21

## 2023-04-21 NOTE — TELEPHONE ENCOUNTER
Called pt verified name and  informed pt that rx amlodipine was sent in to [facility] yesterday at 4;30 pm. Pt stated that [facility] said they had not received the rx but system stated that it was confirmed received.  Pt would like to send rx to [facility] in [place] from now on pt not happy with [facility] pharmacy

## 2023-04-21 NOTE — TELEPHONE ENCOUNTER
Pt called and needs refills on listed medications. Pt stated that the pharmacy supposedly already sent these to us but did not see anything in chart.  Pt would like them sent to the [facility] in [place] from now on.   enalapril (VASOTEC) 20 mg tablet amLODIPine (NORVASC) 2.5 mg

## 2023-04-21 NOTE — TELEPHONE ENCOUNTER
PCP: Marina Lujan MD     Last appt: 3/24/2023     Future Appointments   Date Time Provider Geovanna Ferrell   9/13/2023  9:30 AM Marina Lujan MD [facility] AMB          Requested Prescriptions     Pending Prescriptions Disp Refills    amLODIPine (NORVASC) 2.5 mg tablet 90 Tablet 1     Sig: Take 1 Tablet by mouth daily. enalapril (VASOTEC) 20 mg tablet 180 Tablet 1     Sig: Take 1 Tablet by mouth two (2) times a day.

## 2023-07-12 RX ORDER — ATORVASTATIN CALCIUM 80 MG/1
TABLET, FILM COATED ORAL
Qty: 90 TABLET | Refills: 3 | Status: SHIPPED | OUTPATIENT
Start: 2023-07-12

## 2023-07-12 NOTE — TELEPHONE ENCOUNTER
PCP: Jenni Mckenzie MD     Last appt:  3/24/2023      Future Appointments   Date Time Provider [address]   9/13/2023  9:30 AM Jenni Mckenzie MD [facility] AMB          Requested Prescriptions     Pending Prescriptions Disp Refills    atorvastatin (LIPITOR) 80 MG tablet [Pharmacy Med Name: ATORVASTATIN 80 MG TABLET] 90 tablet 3     Sig: TAKE 1 TABLET BY MOUTH EVERY DAY

## 2023-07-13 RX ORDER — ALFUZOSIN HYDROCHLORIDE 10 MG/1
TABLET, EXTENDED RELEASE ORAL
Qty: 90 TABLET | Refills: 0 | Status: SHIPPED | OUTPATIENT
Start: 2023-07-13

## 2023-07-13 NOTE — TELEPHONE ENCOUNTER
PCP: Tuyet Bui MD     Last appt:  3/24/2023    Future Appointments   Date Time Provider [address]   9/13/2023  9:30 AM Tuyet Bui MD [facility] BS AMB          Requested Prescriptions     Pending Prescriptions Disp Refills    alfuzosin (UROXATRAL) 10 MG extended release tablet 90 tablet 0     Sig: TAKE 1 TABLET BY MOUTH DAILY AFTER A MEAL

## 2023-10-08 DIAGNOSIS — I10 ESSENTIAL (PRIMARY) HYPERTENSION: ICD-10-CM

## 2023-10-09 RX ORDER — AMLODIPINE BESYLATE 2.5 MG/1
2.5 TABLET ORAL DAILY
Qty: 90 TABLET | Refills: 1 | Status: SHIPPED | OUTPATIENT
Start: 2023-10-09

## 2023-10-09 NOTE — TELEPHONE ENCOUNTER
PCP: Tuyet Bui MD     Last appt:  3/24/2023    No future appointments.        Requested Prescriptions     Pending Prescriptions Disp Refills    amLODIPine (NORVASC) 2.5 MG tablet [Pharmacy Med Name: AMLODIPINE BESYLATE 2.5 MG TAB] 90 tablet 1     Sig: TAKE 1 TABLET BY MOUTH EVERY DAY

## 2023-10-22 DIAGNOSIS — I10 ESSENTIAL (PRIMARY) HYPERTENSION: ICD-10-CM

## 2023-10-23 RX ORDER — ENALAPRIL MALEATE 20 MG/1
20 TABLET ORAL 2 TIMES DAILY
Qty: 180 TABLET | Refills: 1 | Status: SHIPPED | OUTPATIENT
Start: 2023-10-23

## 2023-10-23 NOTE — TELEPHONE ENCOUNTER
PCP: Kike Downing MD     Last appt:  3/24/2023    No future appointments. Requested Prescriptions     Pending Prescriptions Disp Refills    enalapril (VASOTEC) 20 MG tablet [Pharmacy Med Name: ENALAPRIL MALEATE 20 MG TAB] 180 tablet 1     Sig: TAKE 1 TABLET BY MOUTH TWO TIMES A DAY.

## 2023-10-24 RX ORDER — ALFUZOSIN HYDROCHLORIDE 10 MG/1
TABLET, EXTENDED RELEASE ORAL
Qty: 90 TABLET | Refills: 0 | Status: SHIPPED | OUTPATIENT
Start: 2023-10-24

## 2023-10-24 NOTE — TELEPHONE ENCOUNTER
PCP: Tuyet Bui MD     Last appt:  3/24/2023    No future appointments.        Requested Prescriptions     Pending Prescriptions Disp Refills    alfuzosin (UROXATRAL) 10 MG extended release tablet 90 tablet 0     Sig: TAKE 1 TABLET BY MOUTH DAILY AFTER A MEAL

## 2024-01-19 DIAGNOSIS — N40.1 BENIGN PROSTATIC HYPERPLASIA WITH URINARY OBSTRUCTION: Primary | ICD-10-CM

## 2024-01-19 DIAGNOSIS — N13.8 BENIGN PROSTATIC HYPERPLASIA WITH URINARY OBSTRUCTION: Primary | ICD-10-CM

## 2024-01-19 NOTE — TELEPHONE ENCOUNTER
PCP: Laine Bland MD     Last appt:  3/24/2023    No future appointments.       Requested Prescriptions     Pending Prescriptions Disp Refills    alfuzosin (UROXATRAL) 10 MG extended release tablet [Pharmacy Med Name: ALFUZOSIN HCL ER 10 MG TABLET] 90 tablet 0     Sig: TAKE 1 TABLET BY MOUTH DAILY AFTER A MEAL.

## 2024-01-20 RX ORDER — ALFUZOSIN HYDROCHLORIDE 10 MG/1
TABLET, EXTENDED RELEASE ORAL
Qty: 90 TABLET | Refills: 0 | Status: SHIPPED | OUTPATIENT
Start: 2024-01-20

## 2024-02-21 ENCOUNTER — TELEPHONE (OUTPATIENT)
Age: 59
End: 2024-02-21

## 2024-02-21 ENCOUNTER — OFFICE VISIT (OUTPATIENT)
Facility: CLINIC | Age: 59
End: 2024-02-21
Payer: COMMERCIAL

## 2024-02-21 VITALS
BODY MASS INDEX: 25.6 KG/M2 | TEMPERATURE: 97.8 F | HEIGHT: 73 IN | DIASTOLIC BLOOD PRESSURE: 89 MMHG | OXYGEN SATURATION: 99 % | RESPIRATION RATE: 16 BRPM | WEIGHT: 193.2 LBS | HEART RATE: 59 BPM | SYSTOLIC BLOOD PRESSURE: 143 MMHG

## 2024-02-21 DIAGNOSIS — R73.01 IFG (IMPAIRED FASTING GLUCOSE): ICD-10-CM

## 2024-02-21 DIAGNOSIS — R06.83 SNORING: ICD-10-CM

## 2024-02-21 DIAGNOSIS — F41.9 ANXIETY: ICD-10-CM

## 2024-02-21 DIAGNOSIS — Z11.59 NEED FOR HEPATITIS C SCREENING TEST: ICD-10-CM

## 2024-02-21 DIAGNOSIS — Z12.5 SCREENING FOR PROSTATE CANCER: ICD-10-CM

## 2024-02-21 DIAGNOSIS — E78.2 MIXED HYPERLIPIDEMIA: ICD-10-CM

## 2024-02-21 DIAGNOSIS — Z11.4 SCREENING FOR HIV (HUMAN IMMUNODEFICIENCY VIRUS): ICD-10-CM

## 2024-02-21 DIAGNOSIS — I10 ESSENTIAL HYPERTENSION: Primary | ICD-10-CM

## 2024-02-21 PROCEDURE — 3079F DIAST BP 80-89 MM HG: CPT | Performed by: INTERNAL MEDICINE

## 2024-02-21 PROCEDURE — 99214 OFFICE O/P EST MOD 30 MIN: CPT | Performed by: INTERNAL MEDICINE

## 2024-02-21 PROCEDURE — 3077F SYST BP >= 140 MM HG: CPT | Performed by: INTERNAL MEDICINE

## 2024-02-21 RX ORDER — HYDROXYZINE PAMOATE 25 MG/1
25 CAPSULE ORAL 3 TIMES DAILY PRN
Qty: 45 CAPSULE | Refills: 0 | Status: SHIPPED | OUTPATIENT
Start: 2024-02-21 | End: 2024-03-07

## 2024-02-21 RX ORDER — AMLODIPINE BESYLATE 2.5 MG/1
2.5 TABLET ORAL 2 TIMES DAILY
Qty: 60 TABLET | Refills: 1 | Status: SHIPPED | OUTPATIENT
Start: 2024-02-21 | End: 2024-02-23 | Stop reason: DRUGHIGH

## 2024-02-21 SDOH — ECONOMIC STABILITY: INCOME INSECURITY: HOW HARD IS IT FOR YOU TO PAY FOR THE VERY BASICS LIKE FOOD, HOUSING, MEDICAL CARE, AND HEATING?: NOT HARD AT ALL

## 2024-02-21 SDOH — ECONOMIC STABILITY: FOOD INSECURITY: WITHIN THE PAST 12 MONTHS, YOU WORRIED THAT YOUR FOOD WOULD RUN OUT BEFORE YOU GOT MONEY TO BUY MORE.: NEVER TRUE

## 2024-02-21 SDOH — ECONOMIC STABILITY: HOUSING INSECURITY
IN THE LAST 12 MONTHS, WAS THERE A TIME WHEN YOU DID NOT HAVE A STEADY PLACE TO SLEEP OR SLEPT IN A SHELTER (INCLUDING NOW)?: NO

## 2024-02-21 SDOH — ECONOMIC STABILITY: FOOD INSECURITY: WITHIN THE PAST 12 MONTHS, THE FOOD YOU BOUGHT JUST DIDN'T LAST AND YOU DIDN'T HAVE MONEY TO GET MORE.: NEVER TRUE

## 2024-02-21 ASSESSMENT — PATIENT HEALTH QUESTIONNAIRE - PHQ9
SUM OF ALL RESPONSES TO PHQ QUESTIONS 1-9: 0
2. FEELING DOWN, DEPRESSED OR HOPELESS: 0
SUM OF ALL RESPONSES TO PHQ9 QUESTIONS 1 & 2: 0
1. LITTLE INTEREST OR PLEASURE IN DOING THINGS: 0

## 2024-02-21 NOTE — PROGRESS NOTES
Sean Guo  Identified pt with two pt identifiers(name and ).  Chief Complaint   Patient presents with    Hypertension    Anxiety    Sleep Problem       1. Have you been to the ER, urgent care clinic since your last visit?  Hospitalized since your last visit? NO    2. Have you seen or consulted any other health care providers outside of the [facility] since your last visit?  Include any pap smears or colon screening. NO  Pt had Flu and Covid vaccines.     Provider notified of reason for visit, vitals and flowsheets obtained on patients.     Patient received paperwork for advance directive during previous visit but has not completed at this time     Reviewed record In preparation for visit, huddled with provider and have obtained necessary documentation      Health Maintenance Due   Topic    HIV screen     Hepatitis C screen     Flu vaccine (1)    COVID-19 Vaccine (3 - 2023-24 season)       Wt Readings from Last 3 Encounters:   24 87.6 kg (193 lb 3.2 oz)   23 83 kg (183 lb)   23 83.9 kg (185 lb)     Temp Readings from Last 3 Encounters:   24 97.8 °F (36.6 °C) (Oral)     BP Readings from Last 3 Encounters:   24 (!) 143/89   23 (!) 150/80   23 (!) 161/101     Pulse Readings from Last 3 Encounters:   24 59   23 76   23 86          No data to display                  Learning Assessment:  :         2024     8:30 AM   [facility] AMB LEARNING ASSESSMENT   Primary Learner Patient   level of education 4 YEARS OF COLLEGE   Primary Language ENGLISH   Learning Preference DEMONSTRATION   Answered By Patient   Relationship to Learner SELF       Fall Risk Assessment:  :          No data to display                Abuse Screening:  :          No data to display                ADL Screening:  :         2024     8:00 AM   ADL ASSESSMENT   Feeding yourself No Help Needed   Getting from bed to chair No Help Needed   Getting dressed No Help Needed   Bathing

## 2024-02-21 NOTE — PROGRESS NOTES
Chief Complaint   Patient presents with    Hypertension    Anxiety    Sleep Problem       HISTORY OF PRESENT ILLNESS  Sean Guo is a 58 y.o. male    Presents for follow up evaluation. Last seen in clinic on 3/24/23; did not follow up in 3 months for BP check as instructed.    Patient complains of:  1) would like to have testing for sleep study. Has loud snoring and occasional daytime somnolence. Thinks sleep apnea is making his BP stay high.  2) will be celebrating 30th anniversary in April by going on a cruise from [place] to [facility]. Would like prescription for Paxlovid in case he gets COVID.  3) Has been taking CBD and smoking weed for anxiety; says it helps his anxiety. In the past, did not do well on medications (including Wellbutrin, Prozac, Zoloft, Buspar). Wants to know if he can have Valium or Xanax to take on cruise.    Denies headaches, CP, SOB, dizziness, heart palpitations, muscle cramps, or leg swelling. Compliant with enalapril and amlodipine. In the past, thinks amlodipine made him have swelling under his eyes when he took total of amlodipine 5 mg daily.    Pt had Flu and COVID vaccines.      Patient Active Problem List   Diagnosis    Nephrolithiasis    Anxiety    Essential hypertension    Hyperlipidemia    Benign prostatic hyperplasia with urinary obstruction     Past Medical History:   Diagnosis Date    ADD (attention deficit disorder)     Cholelithiasis 9/29/14    CT in 8/14. Saw Dr. Ramon; asymptomatic gallstones    Depression with anxiety 6/16/2015 6/27/13: PHQ-9 score of 13 (moderate depression), started on Wellbutrin in 6/13     GERD (gastroesophageal reflux disease)     possibly    Hyperlipidemia     high cholesterol    Hypertension     IBS (irritable bowel syndrome) 9/29/2014    Nephrolithiasis     KIDNEY STONES    Other recurrent depressive disorders (HCC)     generalized anxiety disorder     Allergies   Allergen Reactions    Sertraline Other (See Comments)     Abdominal pain, nausea

## 2024-02-23 DIAGNOSIS — I10 ESSENTIAL HYPERTENSION: ICD-10-CM

## 2024-02-23 RX ORDER — AMLODIPINE BESYLATE 5 MG/1
2.5 TABLET ORAL 2 TIMES DAILY
Qty: 30 TABLET | Refills: 2 | Status: SHIPPED | OUTPATIENT
Start: 2024-02-23

## 2024-02-23 RX ORDER — AMLODIPINE BESYLATE 2.5 MG/1
2.5 TABLET ORAL 2 TIMES DAILY
Qty: 60 TABLET | Refills: 1 | Status: CANCELLED | OUTPATIENT
Start: 2024-02-23

## 2024-02-23 NOTE — TELEPHONE ENCOUNTER
Received a fax from [facility] pharmacy alternative medication needed for Amlodipine Besylate 2.5 mg insurance will only cover for 1 tab per day may cover 5 mg 1/2 tab BID message routed to provider.

## 2024-03-03 DIAGNOSIS — I10 ESSENTIAL HYPERTENSION: ICD-10-CM

## 2024-03-04 DIAGNOSIS — F41.9 ANXIETY: ICD-10-CM

## 2024-03-04 RX ORDER — AMLODIPINE BESYLATE 5 MG/1
TABLET ORAL
Qty: 90 TABLET | Refills: 0 | Status: SHIPPED | OUTPATIENT
Start: 2024-03-04

## 2024-03-04 RX ORDER — HYDROXYZINE PAMOATE 25 MG/1
25 CAPSULE ORAL 3 TIMES DAILY PRN
Qty: 45 CAPSULE | Refills: 0 | Status: SHIPPED | OUTPATIENT
Start: 2024-03-04 | End: 2024-03-19

## 2024-03-04 NOTE — TELEPHONE ENCOUNTER
PCP: Laine Bland MD     Last appt:  2/21/2024      Future Appointments   Date Time Provider Department Center   3/27/2024  1:40 PM Laine Bland MD [facility] BS AMB   5/20/2024  9:00 AM Alesia Parra MD [facility] BS AMB          Requested Prescriptions     Pending Prescriptions Disp Refills    hydrOXYzine pamoate (VISTARIL) 25 MG capsule 45 capsule 0     Sig: Take 1 capsule by mouth 3 times daily as needed for Anxiety

## 2024-03-27 ENCOUNTER — OFFICE VISIT (OUTPATIENT)
Facility: CLINIC | Age: 59
End: 2024-03-27
Payer: COMMERCIAL

## 2024-03-27 VITALS
HEIGHT: 73 IN | TEMPERATURE: 97.8 F | RESPIRATION RATE: 16 BRPM | BODY MASS INDEX: 25.47 KG/M2 | WEIGHT: 192.2 LBS | DIASTOLIC BLOOD PRESSURE: 84 MMHG | SYSTOLIC BLOOD PRESSURE: 135 MMHG | HEART RATE: 66 BPM | OXYGEN SATURATION: 96 %

## 2024-03-27 DIAGNOSIS — I10 ESSENTIAL HYPERTENSION: ICD-10-CM

## 2024-03-27 DIAGNOSIS — E78.2 MIXED HYPERLIPIDEMIA: ICD-10-CM

## 2024-03-27 DIAGNOSIS — F41.9 ANXIETY: ICD-10-CM

## 2024-03-27 DIAGNOSIS — R73.01 IFG (IMPAIRED FASTING GLUCOSE): ICD-10-CM

## 2024-03-27 DIAGNOSIS — Z12.5 SCREENING FOR PROSTATE CANCER: ICD-10-CM

## 2024-03-27 DIAGNOSIS — Z11.59 NEED FOR HEPATITIS C SCREENING TEST: ICD-10-CM

## 2024-03-27 DIAGNOSIS — I10 ESSENTIAL (PRIMARY) HYPERTENSION: Primary | ICD-10-CM

## 2024-03-27 DIAGNOSIS — Z11.4 SCREENING FOR HIV (HUMAN IMMUNODEFICIENCY VIRUS): ICD-10-CM

## 2024-03-27 PROCEDURE — 3075F SYST BP GE 130 - 139MM HG: CPT | Performed by: INTERNAL MEDICINE

## 2024-03-27 PROCEDURE — 99213 OFFICE O/P EST LOW 20 MIN: CPT | Performed by: INTERNAL MEDICINE

## 2024-03-27 PROCEDURE — 3079F DIAST BP 80-89 MM HG: CPT | Performed by: INTERNAL MEDICINE

## 2024-03-27 RX ORDER — HYDROCHLOROTHIAZIDE 25 MG/1
25 TABLET ORAL 3 TIMES DAILY
COMMUNITY

## 2024-03-27 RX ORDER — ENALAPRIL MALEATE 20 MG/1
20 TABLET ORAL 2 TIMES DAILY
Qty: 180 TABLET | Refills: 3 | Status: SHIPPED | OUTPATIENT
Start: 2024-03-27

## 2024-03-27 NOTE — PROGRESS NOTES
Chief Complaint   Patient presents with    Hypertension    Anxiety       HISTORY OF PRESENT ILLNESS  Sean Guo is a 58 y.o. male    Presents for 1 month follow-up evaluation of hypertension and anxiety. Tolerating amlodipine 2.5 mg twice daily with no problems. Reports anxiety has been controlled on hydroxyzine 25 mg. Has taken it about 5 times over the past month. Helps calm him gently with no side effects. Has appointment for sleep study scheduled in May.    Will be going on a cruise to [place] in 3 weeks for his 30th wedding anniversary. His daughter graduates in May and will work as a doctor at [employer].    Patient Active Problem List   Diagnosis    Nephrolithiasis    Anxiety    Essential hypertension    Hyperlipidemia    Benign prostatic hyperplasia with urinary obstruction     Past Medical History:   Diagnosis Date    ADD (attention deficit disorder)     Cholelithiasis 9/29/14    CT in 8/14. Saw Dr. Ramon; asymptomatic gallstones    Depression with anxiety 6/16/2015 6/27/13: PHQ-9 score of 13 (moderate depression), started on Wellbutrin in 6/13     GERD (gastroesophageal reflux disease)     possibly    Hyperlipidemia     high cholesterol    Hypertension     IBS (irritable bowel syndrome) 9/29/2014    Nephrolithiasis     KIDNEY STONES    Other recurrent depressive disorders (HCC)     generalized anxiety disorder     Allergies   Allergen Reactions    Sertraline Other (See Comments)     Abdominal pain, nausea       Current Outpatient Medications   Medication Sig Dispense Refill    hydroCHLOROthiazide (HYDRODIURIL) 25 MG tablet Take 1 tablet by mouth in the morning, at noon, and at bedtime PRN      amLODIPine (NORVASC) 5 MG tablet TAKE 1/2 TABLET TWICE A DAY BY MOUTH 90 tablet 0    Lido-Menthol-Methyl Sal-Camph (CBD KINGS EX) Take by mouth      alfuzosin (UROXATRAL) 10 MG extended release tablet TAKE 1 TABLET BY MOUTH DAILY AFTER A MEAL. CLINIC APPOINTMENT NEEDED ([phone]) 90 tablet 0

## 2024-03-27 NOTE — PROGRESS NOTES
Sean Guo  Identified pt with two pt identifiers(name and ).  Chief Complaint   Patient presents with    Hypertension    Anxiety       1. Have you been to the ER, urgent care clinic since your last visit?  Hospitalized since your last visit? NO    2. Have you seen or consulted any other health care providers outside of the [facility] since your last visit?  Include any pap smears or colon screening. NO  Pt had Covid and Flu vaccine.     Provider notified of reason for visit, vitals and flowsheets obtained on patients.     Patient received paperwork for advance directive during previous visit but has not completed at this time     Reviewed record In preparation for visit, huddled with provider and have obtained necessary documentation      Health Maintenance Due   Topic    HIV screen     Hepatitis C screen     Flu vaccine (1)    COVID-19 Vaccine (3 - 2023-24 season)       Wt Readings from Last 3 Encounters:   24 87.2 kg (192 lb 3.2 oz)   24 87.6 kg (193 lb 3.2 oz)   23 83 kg (183 lb)     Temp Readings from Last 3 Encounters:   24 97.8 °F (36.6 °C) (Oral)   24 97.8 °F (36.6 °C) (Oral)     BP Readings from Last 3 Encounters:   24 135/84   24 (!) 143/89   23 (!) 150/80     Pulse Readings from Last 3 Encounters:   24 66   24 59   23 76          No data to display                  Learning Assessment:  :         2024     8:30 AM   [facility] AMB LEARNING ASSESSMENT   Primary Learner Patient   level of education 4 YEARS OF COLLEGE   Primary Language ENGLISH   Learning Preference DEMONSTRATION   Answered By Patient   Relationship to Learner SELF       Fall Risk Assessment:  :          No data to display                Abuse Screening:  :          No data to display                ADL Screening:  :         2024     8:00 AM   ADL ASSESSMENT   Feeding yourself No Help Needed   Getting from bed to chair No Help Needed   Getting dressed No

## 2024-03-28 LAB
ALBUMIN SERPL-MCNC: 4.1 G/DL (ref 3.5–5)
ALBUMIN/GLOB SERPL: 1.4 (ref 1.1–2.2)
ALP SERPL-CCNC: 110 U/L (ref 45–117)
ALT SERPL-CCNC: 33 U/L (ref 12–78)
ANION GAP SERPL CALC-SCNC: 3 MMOL/L (ref 5–15)
AST SERPL-CCNC: 15 U/L (ref 15–37)
BASOPHILS # BLD: 0 K/UL (ref 0–0.1)
BASOPHILS NFR BLD: 1 % (ref 0–1)
BILIRUB SERPL-MCNC: 0.9 MG/DL (ref 0.2–1)
BUN SERPL-MCNC: 11 MG/DL (ref 6–20)
BUN/CREAT SERPL: 12 (ref 12–20)
CALCIUM SERPL-MCNC: 9.4 MG/DL (ref 8.5–10.1)
CHLORIDE SERPL-SCNC: 107 MMOL/L (ref 97–108)
CHOLEST SERPL-MCNC: 131 MG/DL
CO2 SERPL-SCNC: 30 MMOL/L (ref 21–32)
CREAT SERPL-MCNC: 0.92 MG/DL (ref 0.7–1.3)
DIFFERENTIAL METHOD BLD: NORMAL
EOSINOPHIL # BLD: 0.1 K/UL (ref 0–0.4)
EOSINOPHIL NFR BLD: 1 % (ref 0–7)
ERYTHROCYTE [DISTWIDTH] IN BLOOD BY AUTOMATED COUNT: 12.2 % (ref 11.5–14.5)
EST. AVERAGE GLUCOSE BLD GHB EST-MCNC: 108 MG/DL
GLOBULIN SER CALC-MCNC: 2.9 G/DL (ref 2–4)
GLUCOSE SERPL-MCNC: 89 MG/DL (ref 65–100)
HBA1C MFR BLD: 5.4 % (ref 4–5.6)
HCT VFR BLD AUTO: 41.8 % (ref 36.6–50.3)
HDLC SERPL-MCNC: 50 MG/DL
HDLC SERPL: 2.6 (ref 0–5)
HGB BLD-MCNC: 13.9 G/DL (ref 12.1–17)
HIV 1+2 AB+HIV1 P24 AG SERPL QL IA: NONREACTIVE
HIV 1/2 RESULT COMMENT: NORMAL
IMM GRANULOCYTES # BLD AUTO: 0 K/UL (ref 0–0.04)
IMM GRANULOCYTES NFR BLD AUTO: 0 % (ref 0–0.5)
LDLC SERPL CALC-MCNC: 57.8 MG/DL (ref 0–100)
LYMPHOCYTES # BLD: 1.7 K/UL (ref 0.8–3.5)
LYMPHOCYTES NFR BLD: 38 % (ref 12–49)
MCH RBC QN AUTO: 29.4 PG (ref 26–34)
MCHC RBC AUTO-ENTMCNC: 33.3 G/DL (ref 30–36.5)
MCV RBC AUTO: 88.6 FL (ref 80–99)
MONOCYTES # BLD: 0.5 K/UL (ref 0–1)
MONOCYTES NFR BLD: 10 % (ref 5–13)
NEUTS SEG # BLD: 2.2 K/UL (ref 1.8–8)
NEUTS SEG NFR BLD: 50 % (ref 32–75)
NRBC # BLD: 0 K/UL (ref 0–0.01)
NRBC BLD-RTO: 0 PER 100 WBC
PLATELET # BLD AUTO: 234 K/UL (ref 150–400)
PMV BLD AUTO: 9.9 FL (ref 8.9–12.9)
POTASSIUM SERPL-SCNC: 3.9 MMOL/L (ref 3.5–5.1)
PROT SERPL-MCNC: 7 G/DL (ref 6.4–8.2)
PSA SERPL-MCNC: 1.9 NG/ML (ref 0.01–4)
RBC # BLD AUTO: 4.72 M/UL (ref 4.1–5.7)
SODIUM SERPL-SCNC: 140 MMOL/L (ref 136–145)
TRIGL SERPL-MCNC: 116 MG/DL
VLDLC SERPL CALC-MCNC: 23.2 MG/DL
WBC # BLD AUTO: 4.4 K/UL (ref 4.1–11.1)

## 2024-03-29 LAB
HCV AB SERPL QL IA: NORMAL
HCV IGG SERPL QL IA: NON REACTIVE S/CO RATIO

## 2024-04-10 DIAGNOSIS — E78.2 MIXED HYPERLIPIDEMIA: Primary | ICD-10-CM

## 2024-04-10 DIAGNOSIS — F41.9 ANXIETY: ICD-10-CM

## 2024-04-10 DIAGNOSIS — N40.1 BENIGN PROSTATIC HYPERPLASIA WITH URINARY OBSTRUCTION: ICD-10-CM

## 2024-04-10 DIAGNOSIS — N13.8 BENIGN PROSTATIC HYPERPLASIA WITH URINARY OBSTRUCTION: ICD-10-CM

## 2024-04-10 RX ORDER — ATORVASTATIN CALCIUM 80 MG/1
TABLET, FILM COATED ORAL
Qty: 90 TABLET | Refills: 3 | Status: SHIPPED | OUTPATIENT
Start: 2024-04-10

## 2024-04-10 RX ORDER — ALFUZOSIN HYDROCHLORIDE 10 MG/1
TABLET, EXTENDED RELEASE ORAL
Qty: 90 TABLET | Refills: 3 | Status: SHIPPED | OUTPATIENT
Start: 2024-04-10

## 2024-04-10 RX ORDER — HYDROXYZINE PAMOATE 25 MG/1
25 CAPSULE ORAL 3 TIMES DAILY PRN
Qty: 90 CAPSULE | Refills: 1 | Status: SHIPPED | OUTPATIENT
Start: 2024-04-10 | End: 2024-06-09

## 2024-04-10 NOTE — TELEPHONE ENCOUNTER
Pt is going on trip this Sunday and will be gone a few months, needs refill on medications before then. [facility] [place], alfuzosin (UROXATRAL) 10 MG extended release tablet

## 2024-04-10 NOTE — TELEPHONE ENCOUNTER
PCP: Laine Bland MD     Last appt:  3/27/2024      Future Appointments   Date Time Provider Department Center   5/20/2024  9:00 AM Alesia Parra MD [facility] BS AMB          Requested Prescriptions     Pending Prescriptions Disp Refills    hydrOXYzine pamoate (VISTARIL) 25 MG capsule [Pharmacy Med Name: HYDROXYZINE JHONY 25 MG CAP] 45 capsule 0     Sig: TAKE 1 CAPSULE BY MOUTH 3 TIMES DAILY AS NEEDED FOR ANXIETY.    atorvastatin (LIPITOR) 80 MG tablet [Pharmacy Med Name: ATORVASTATIN 80 MG TABLET] 90 tablet 3     Sig: TAKE 1 TABLET BY MOUTH EVERY DAY    alfuzosin (UROXATRAL) 10 MG extended release tablet 90 tablet 0     Sig: TAKE 1 TABLET BY MOUTH DAILY AFTER A MEAL. CLINIC APPOINTMENT NEEDED ([phone])

## 2024-05-03 DIAGNOSIS — F41.9 ANXIETY: ICD-10-CM

## 2024-05-03 NOTE — TELEPHONE ENCOUNTER
PCP: Laine Bland MD     Last appt:  3/27/2024      Future Appointments   Date Time Provider Department Center   5/20/2024  9:00 AM Alesia Parra MD [facility] AMB          Requested Prescriptions     Pending Prescriptions Disp Refills    hydrOXYzine pamoate (VISTARIL) 25 MG capsule [Pharmacy Med Name: HYDROXYZINE JHONY 25 MG CAP] 270 capsule 1     Sig: TAKE 1 CAPSULE BY MOUTH 3 TIMES DAILY AS NEEDED FOR ANXIETY.

## 2024-05-04 RX ORDER — HYDROXYZINE PAMOATE 25 MG/1
25 CAPSULE ORAL 3 TIMES DAILY PRN
Qty: 270 CAPSULE | Refills: 1 | Status: SHIPPED | OUTPATIENT
Start: 2024-05-04 | End: 2024-10-31

## 2024-05-17 ASSESSMENT — SLEEP AND FATIGUE QUESTIONNAIRES
HOW LIKELY ARE YOU TO NOD OFF OR FALL ASLEEP IN A CAR, WHILE STOPPED FOR A FEW MINUTES IN TRAFFIC: WOULD NEVER DOZE
HOW LIKELY ARE YOU TO NOD OFF OR FALL ASLEEP WHILE LYING DOWN TO REST IN THE AFTERNOON WHEN CIRCUMSTANCES PERMIT: MODERATE CHANCE OF DOZING
SELECT ANY OF THE FOLLOWING BEHAVIORS OBSERVED WHILE PATIENT ASLEEP: LOUD SNORING;LIGHT SNORING;TWITCHING OF LEGS OR FEET;PAUSES IN BREATHING
WHAT TIME DO YOU USUALLY GO TO BED: 22:30
HOW LIKELY ARE YOU TO NOD OFF OR FALL ASLEEP WHILE SITTING AND READING: HIGH CHANCE OF DOZING
HOW LIKELY ARE YOU TO NOD OFF OR FALL ASLEEP WHILE SITTING QUIETLY AFTER LUNCH WITHOUT ALCOHOL: WOULD NEVER DOZE
DO YOU WORK SHIFTS: NO
DO YOU HAVE DIFFICULTY WATCHING A MOVIE OR VIDEO BECAUSE YOU BECOME SLEEPY OR TIRED: YES, EXTREME
DO YOU HAVE PROBLEMS WITH FREQUENT AWAKENINGS AT NIGHT: YES
HOW LIKELY ARE YOU TO NOD OFF OR FALL ASLEEP WHILE SITTING AND READING: HIGH CHANCE OF DOZING
HOW LIKELY ARE YOU TO NOD OFF OR FALL ASLEEP WHILE WATCHING TV: HIGH CHANCE OF DOZING
WHAT TIME DO YOU USUALLY WAKE UP: 06:30
NUMBER OF TIMES YOU WAKE PER NIGHT: 8
ARE YOU BOTHERED BY WAKING UP TOO EARLY AND NOT BEING ABLE TO GET BACK TO SLEEP: YES
HAS YOUR RELATIONSHIP WITH FAMILY, FRIENDS OR WORK COLLEAGUES BEEN AFFECTED BECAUSE YOU ARE SLEEPY OR TIRED: NO
HOW LIKELY ARE YOU TO NOD OFF OR FALL ASLEEP WHILE SITTING QUIETLY AFTER LUNCH WITHOUT ALCOHOL: WOULD NEVER DOZE
AVERAGE NUMBER OF SLEEP HOURS: 8
HOW LIKELY ARE YOU TO NOD OFF OR FALL ASLEEP WHILE SITTING INACTIVE IN A PUBLIC PLACE: SLIGHT CHANCE OF DOZING
DO YOU HAVE DIFFICULTY BEING AS ACTIVE AS YOU WANT TO BE IN THE MORNING BECAUSE YOU ARE SLEEPY OR TIRED: YES, LITTLE
DO YOU TAKE NAPS: YES
HOW LIKELY ARE YOU TO NOD OFF OR FALL ASLEEP WHILE SITTING AND TALKING TO SOMEONE: WOULD NEVER DOZE
DO YOU GET TOO LITTLE SLEEP AT NIGHT: YES
FOSQ SCORE: 15
DO YOU HAVE DIFFICULTY OPERATING A MOTOR VEHICLE FOR SHORT DISTANCES (LESS THAN 100 MILES) BECAUSE YOU BECOME SLEEPY: NO
HOW LONG DO YOU NAP: 45 MINUTES TO 1 HOUR
HAS YOUR MOOD BEEN AFFECTED BECAUSE YOU ARE SLEEPY OR TIRED: NO
ESS TOTAL SCORE: 11
DO YOU HAVE DIFFICULTY VISITING YOUR FAMILY OR FRIENDS IN THEIR HOME BECAUSE YOU BECOME SLEEPY OR TIRED: YES, A LITTLE
NECK CIRCUMFERENCE (INCHES): 16.5
DO YOU HAVE DIFFICULTY OPERATING A MOTOR VEHICLE FOR LONG DISTANCES (GREATER THAN 100 MILES) BECAUSE YOU BECOME SLEEPY: NO
SELECT ANY OF THE FOLLOWING BEHAVIORS OBSERVED WHILE YOU ARE ASLEEP: PAUSES IN BREATHING
SELECT ANY OF THE FOLLOWING BEHAVIORS OBSERVED WHILE YOU ARE ASLEEP: LOUD SNORING
ARE YOU BOTHERED BY WAKING UP TOO EARLY AND NOT BEING ABLE TO GET BACK TO SLEEP: YES
SELECT ANY OF THE FOLLOWING BEHAVIORS OBSERVED WHILE YOU ARE ASLEEP: LIGHT SNORING
HOW LIKELY ARE YOU TO NOD OFF OR FALL ASLEEP WHILE SITTING AND TALKING TO SOMEONE: WOULD NEVER DOZE
DO YOU HAVE DIFFICULTY BEING AS ACTIVE AS YOU WANT TO BE IN THE EVENING BECAUSE YOU ARE SLEEPY OR TIRED: YES, MODERATE
HOW LIKELY ARE YOU TO NOD OFF OR FALL ASLEEP WHILE WATCHING TV: HIGH CHANCE OF DOZING
HOW LIKELY ARE YOU TO NOD OFF OR FALL ASLEEP WHEN YOU ARE A PASSENGER IN A CAR FOR AN HOUR WITHOUT A BREAK: MODERATE CHANCE OF DOZING
DO YOU HAVE DIFFICULTY CONCENTRATING ON THE THINGS YOU DO BECAUSE YOU ARE SLEEPY OR TIRED: YES, A LITTLE
DO YOU GENERALLY HAVE DIFFICULTY REMEMBERING THINGS BECAUSE YOU ARE SLEEPY OR TIRED: YES, MODERATE
HOW LIKELY ARE YOU TO NOD OFF OR FALL ASLEEP WHILE LYING DOWN TO REST IN THE AFTERNOON WHEN CIRCUMSTANCES PERMIT: MODERATE CHANCE OF DOZING
AVERAGE NUMBER OF SLEEP HOURS: 8
DO YOU GET TOO LITTLE SLEEP AT NIGHT: YES
HOW LIKELY ARE YOU TO NOD OFF OR FALL ASLEEP WHEN YOU ARE A PASSENGER IN A CAR FOR AN HOUR WITHOUT A BREAK: MODERATE CHANCE OF DOZING
HOW LIKELY ARE YOU TO NOD OFF OR FALL ASLEEP WHILE SITTING INACTIVE IN A PUBLIC PLACE: SLIGHT CHANCE OF DOZING
HOW MANY NAPS DO YOU TAKE PER WEEK: 2
SELECT ANY OF THE FOLLOWING BEHAVIORS OBSERVED WHILE YOU ARE ASLEEP: TWITCHING OF LEGS OR FEET
HOW LIKELY ARE YOU TO NOD OFF OR FALL ASLEEP IN A CAR, WHILE STOPPED FOR A FEW MINUTES IN TRAFFIC: WOULD NEVER DOZE

## 2024-05-20 ENCOUNTER — OFFICE VISIT (OUTPATIENT)
Age: 59
End: 2024-05-20
Payer: COMMERCIAL

## 2024-05-20 VITALS
HEIGHT: 73 IN | SYSTOLIC BLOOD PRESSURE: 133 MMHG | HEART RATE: 72 BPM | OXYGEN SATURATION: 95 % | WEIGHT: 193 LBS | DIASTOLIC BLOOD PRESSURE: 86 MMHG | BODY MASS INDEX: 25.58 KG/M2

## 2024-05-20 DIAGNOSIS — I10 ESSENTIAL HYPERTENSION: ICD-10-CM

## 2024-05-20 DIAGNOSIS — G47.33 OBSTRUCTIVE SLEEP APNEA (ADULT) (PEDIATRIC): Primary | ICD-10-CM

## 2024-05-20 PROCEDURE — 3075F SYST BP GE 130 - 139MM HG: CPT | Performed by: INTERNAL MEDICINE

## 2024-05-20 PROCEDURE — 3079F DIAST BP 80-89 MM HG: CPT | Performed by: INTERNAL MEDICINE

## 2024-05-20 PROCEDURE — 99204 OFFICE O/P NEW MOD 45 MIN: CPT | Performed by: INTERNAL MEDICINE

## 2024-05-20 NOTE — PROGRESS NOTES
(See Comments)     Abdominal pain, nausea         Current Outpatient Medications:     atorvastatin (LIPITOR) 80 MG tablet, TAKE 1 TABLET BY MOUTH EVERY DAY, Disp: 90 tablet, Rfl: 3    alfuzosin (UROXATRAL) 10 MG extended release tablet, TAKE 1 TABLET BY MOUTH DAILY AFTER A MEAL., Disp: 90 tablet, Rfl: 3    hydroCHLOROthiazide (HYDRODIURIL) 25 MG tablet, Take 1 tablet by mouth in the morning, at noon, and at bedtime PRN, Disp: , Rfl:     enalapril (VASOTEC) 20 MG tablet, Take 1 tablet by mouth 2 times daily, Disp: 180 tablet, Rfl: 3    amLODIPine (NORVASC) 5 MG tablet, TAKE 1/2 TABLET TWICE A DAY BY MOUTH, Disp: 90 tablet, Rfl: 0    Lido-Menthol-Methyl Sal-Camph (CBD KINGS EX), Take by mouth, Disp: , Rfl:      He  has a past medical history of ADD (attention deficit disorder), Cholelithiasis, Depression with anxiety, GERD (gastroesophageal reflux disease), Hyperlipidemia, Hypertension, IBS (irritable bowel syndrome), Nephrolithiasis, and Other recurrent depressive disorders (HCC).    He  has a past surgical history that includes Colonoscopy (8/18/14); Lithotripsy; other surgical history; and Cholecystectomy (12/05/2016).    He family history includes Diabetes in his mother; High Cholesterol in his father; Hypertension in his father and mother; Lupus in his mother.    He  reports that he has never smoked. He has never used smokeless tobacco. He reports current alcohol use. He reports current drug use. Drug: Marijuana (Weed).     Review of Systems:  Constitutional:  seasonal weight gain.(Up now)  Eyes:  No blurred vision.  CVS:  No significant chest pain  Pulm:  No significant shortness of breath  GI:  No significant nausea or vomiting  :  No significant nocturia  Musculoskeletal:  No significant joint pain at night  Skin:  No significant rashes  Neuro:  No significant dizziness   Psych:  treated for anxiety    Sleep Review of Systems: notable for no difficulty falling asleep; +frequent awakenings at night;  regular

## 2024-05-20 NOTE — PATIENT INSTRUCTIONS
[address]  [place] [address]  Tel.  [phone]  Fax. [phone] [address]  [place] [address]  Tel.  [phone]  Fax. [phone] [address] [place] [address]  Tel.  [phone]  Fax. [phone]     Sleep Apnea: After Your Visit  Your Care Instructions  Sleep apnea occurs when you frequently stop breathing for 10 seconds or longer during sleep. It can be mild to severe, based on the number of times per hour that you stop breathing or have slowed breathing. Blocked or narrowed airways in your nose, mouth, or throat can cause sleep apnea. Your airway can become blocked when your throat muscles and tongue relax during sleep.  Sleep apnea is common, occurring in 1 out of 20 individuals.  Individuals having any of the following characteristics should be evaluated and treated right away due to high risk and detrimental consequences from untreated sleep apnea:  Obesity  Congestive Heart failure  Atrial Fibrillation  Uncontrolled Hypertension  Type II Diabetes  Night-time Arrhythmias  Stroke  Pulmonary Hypertension  High-risk Driving Populations (pilots, truck drivers, etc.)  Patients Considering Weight-loss Surgery    How do you know you have sleep apnea?  You probably have sleep apnea if you answer 'yes' to 3 or more of the following questions:  S - Have you been told that you Snore?   T - Are you often Tired during the day?  O - Has anyone Observed you stop breathing while sleeping?  P- Do you have (or are being treated for) high blood Pressure?    B - Are you obese (Body Mass Index > 35)?  A - Is your Age 50 years old or older?  N - Is your Neck size greater than 16 inches?  G - Are you male Gender?  A sleep physician can prescribe a breathing device that prevents tissues in the throat from blocking your airway. Or your doctor may recommend using a dental device (oral breathing device) to help keep your airway open. In some cases, surgery may

## 2024-06-18 ENCOUNTER — PROCEDURE VISIT (OUTPATIENT)
Age: 59
End: 2024-06-18

## 2024-06-18 DIAGNOSIS — G47.33 OBSTRUCTIVE SLEEP APNEA (ADULT) (PEDIATRIC): Primary | ICD-10-CM

## 2024-06-18 NOTE — PROGRESS NOTES
S>Sean Guo is a 59 y.o. male seen today to receive a home sleep testing unit (WatchPAT).    Patient was educated on proper hookup and operation of the WatchPAT HST via detailed instruction sheet .  Instruction forms with after hours contact and documentation were signed.    O>    There were no vitals taken for this visit.      A>  1. Obstructive sleep apnea (adult) (pediatric)          P>  General information regarding operations and maintenance of the device was provided.  Follow-up appointment was made to return the WatchPAT HST. He will be contacted once the results have been reviewed.  He was asked to contact our office for any problems regarding his home sleep test study.

## 2024-06-28 ENCOUNTER — CLINICAL DOCUMENTATION (OUTPATIENT)
Age: 59
End: 2024-06-28

## 2024-06-28 DIAGNOSIS — G47.33 OBSTRUCTIVE SLEEP APNEA (ADULT) (PEDIATRIC): Primary | ICD-10-CM

## 2024-06-28 NOTE — PROGRESS NOTES
HSAT in r-drive.    Tech to convey results to patient    Watchpat HSAT positive for severe sleep apnea.       AHI 58/hour and lowest oxygen saturation was 90%. We had discussed treatment options at initial consultation.     He had expressed concern that he did not think he would tolerate a PAP device.  The oral appliances are more for mild sleep apnea so would not be the best option for him.  We had also talked about inspire therapy.  In order to be a candidate for inspire therapy you first have to have a trial of positive airway pressure since it is the gold standard of care.  Insurance would require this trial before authorizing.  I have attached an order for APAP.  He can start using it when he gets it and I will see him back after about a month on therapy.  If he has difficulty tolerating it then we can proceed with a referral to ENT for inspire therapy consideration.

## 2024-07-03 ENCOUNTER — TELEPHONE (OUTPATIENT)
Age: 59
End: 2024-07-03

## 2024-08-25 DIAGNOSIS — I10 ESSENTIAL HYPERTENSION: ICD-10-CM

## 2024-08-26 RX ORDER — AMLODIPINE BESYLATE 5 MG/1
TABLET ORAL
Qty: 90 TABLET | Refills: 0 | Status: SHIPPED | OUTPATIENT
Start: 2024-08-26 | End: 2024-08-26 | Stop reason: CLARIF

## 2024-08-26 RX ORDER — AMLODIPINE BESYLATE 5 MG/1
2.5 TABLET ORAL 2 TIMES DAILY
Qty: 90 TABLET | Refills: 1 | Status: SHIPPED | OUTPATIENT
Start: 2024-08-26

## 2024-08-26 NOTE — TELEPHONE ENCOUNTER
PCP: Laine Bland MD     Last appt:  3/27/2024    No future appointments.       Requested Prescriptions     Pending Prescriptions Disp Refills    amLODIPine (NORVASC) 5 MG tablet [Pharmacy Med Name: AMLODIPINE BESYLATE 5 MG TAB] 90 tablet 0     Sig: TAKE 1/2 TABLET TWICE A DAY BY MOUTH

## 2024-12-31 DIAGNOSIS — I10 ESSENTIAL HYPERTENSION: ICD-10-CM

## 2024-12-31 RX ORDER — AMLODIPINE BESYLATE 5 MG/1
2.5 TABLET ORAL DAILY
Qty: 90 TABLET | Refills: 0 | Status: SHIPPED | OUTPATIENT
Start: 2024-12-31

## 2024-12-31 NOTE — TELEPHONE ENCOUNTER
Future Appointments:  No future appointments.     Last Appointment With Me:  3/27/2024     Requested Prescriptions     Pending Prescriptions Disp Refills    amLODIPine (NORVASC) 5 MG tablet [Pharmacy Med Name: AMLODIPINE BESYLATE 5 MG TAB] 90 tablet 1     Sig: TAKE 1/2 TABLET TWICE A DAY BY MOUTH

## 2025-02-01 DIAGNOSIS — N40.1 BENIGN PROSTATIC HYPERPLASIA WITH URINARY OBSTRUCTION: ICD-10-CM

## 2025-02-01 DIAGNOSIS — N13.8 BENIGN PROSTATIC HYPERPLASIA WITH URINARY OBSTRUCTION: ICD-10-CM

## 2025-02-01 RX ORDER — ALFUZOSIN HYDROCHLORIDE 10 MG/1
TABLET, EXTENDED RELEASE ORAL
Qty: 90 TABLET | Refills: 0 | Status: SHIPPED | OUTPATIENT
Start: 2025-02-01

## 2025-03-29 DIAGNOSIS — E78.2 MIXED HYPERLIPIDEMIA: ICD-10-CM

## 2025-03-29 RX ORDER — ATORVASTATIN CALCIUM 80 MG/1
80 TABLET, FILM COATED ORAL DAILY
Qty: 90 TABLET | Refills: 3 | Status: SHIPPED | OUTPATIENT
Start: 2025-03-29

## 2025-04-02 DIAGNOSIS — I10 ESSENTIAL (PRIMARY) HYPERTENSION: ICD-10-CM

## 2025-04-02 RX ORDER — ENALAPRIL MALEATE 20 MG/1
20 TABLET ORAL 2 TIMES DAILY
Qty: 180 TABLET | Refills: 0 | Status: SHIPPED | OUTPATIENT
Start: 2025-04-02

## 2025-04-02 NOTE — TELEPHONE ENCOUNTER
Future Appointments:  Future Appointments   Date Time Provider Department Center   4/29/2025  9:10 AM Laine Bland MD [facility] BS E[facility] DEP        Last Appointment With Me:  3/27/2024     Requested Prescriptions     Pending Prescriptions Disp Refills    enalapril (VASOTEC) 20 MG tablet [Pharmacy Med Name: ENALAPRIL MALEATE 20 MG TAB] 180 tablet 3     Sig: TAKE 1 TABLET BY MOUTH TWICE A DAY

## 2025-04-29 ENCOUNTER — OFFICE VISIT (OUTPATIENT)
Facility: CLINIC | Age: 60
End: 2025-04-29
Payer: COMMERCIAL

## 2025-04-29 VITALS
WEIGHT: 198.7 LBS | DIASTOLIC BLOOD PRESSURE: 84 MMHG | RESPIRATION RATE: 16 BRPM | HEART RATE: 63 BPM | TEMPERATURE: 97.9 F | HEIGHT: 73 IN | BODY MASS INDEX: 26.33 KG/M2 | OXYGEN SATURATION: 98 % | SYSTOLIC BLOOD PRESSURE: 135 MMHG

## 2025-04-29 DIAGNOSIS — Z83.3 FAMILY HISTORY OF DIABETES MELLITUS IN MOTHER: ICD-10-CM

## 2025-04-29 DIAGNOSIS — N13.8 BENIGN PROSTATIC HYPERPLASIA WITH URINARY OBSTRUCTION: ICD-10-CM

## 2025-04-29 DIAGNOSIS — Z00.00 ENCOUNTER FOR WELL ADULT EXAM WITHOUT ABNORMAL FINDINGS: Primary | ICD-10-CM

## 2025-04-29 DIAGNOSIS — E78.2 MIXED HYPERLIPIDEMIA: ICD-10-CM

## 2025-04-29 DIAGNOSIS — F41.9 ANXIETY: ICD-10-CM

## 2025-04-29 DIAGNOSIS — Z12.11 SCREENING FOR COLON CANCER: ICD-10-CM

## 2025-04-29 DIAGNOSIS — N40.1 BENIGN PROSTATIC HYPERPLASIA WITH URINARY OBSTRUCTION: ICD-10-CM

## 2025-04-29 DIAGNOSIS — G47.33 OBSTRUCTIVE SLEEP APNEA SYNDROME: ICD-10-CM

## 2025-04-29 DIAGNOSIS — I10 ESSENTIAL HYPERTENSION: ICD-10-CM

## 2025-04-29 DIAGNOSIS — Z12.5 SCREENING FOR PROSTATE CANCER: ICD-10-CM

## 2025-04-29 PROCEDURE — 3075F SYST BP GE 130 - 139MM HG: CPT | Performed by: INTERNAL MEDICINE

## 2025-04-29 PROCEDURE — 3079F DIAST BP 80-89 MM HG: CPT | Performed by: INTERNAL MEDICINE

## 2025-04-29 PROCEDURE — 99396 PREV VISIT EST AGE 40-64: CPT | Performed by: INTERNAL MEDICINE

## 2025-04-29 RX ORDER — ALFUZOSIN HYDROCHLORIDE 10 MG/1
TABLET, EXTENDED RELEASE ORAL
Qty: 90 TABLET | Refills: 0 | Status: SHIPPED | OUTPATIENT
Start: 2025-04-29

## 2025-04-29 SDOH — ECONOMIC STABILITY: FOOD INSECURITY: WITHIN THE PAST 12 MONTHS, YOU WORRIED THAT YOUR FOOD WOULD RUN OUT BEFORE YOU GOT MONEY TO BUY MORE.: NEVER TRUE

## 2025-04-29 SDOH — ECONOMIC STABILITY: FOOD INSECURITY: WITHIN THE PAST 12 MONTHS, THE FOOD YOU BOUGHT JUST DIDN'T LAST AND YOU DIDN'T HAVE MONEY TO GET MORE.: NEVER TRUE

## 2025-04-29 ASSESSMENT — PATIENT HEALTH QUESTIONNAIRE - PHQ9
SUM OF ALL RESPONSES TO PHQ QUESTIONS 1-9: 0
1. LITTLE INTEREST OR PLEASURE IN DOING THINGS: NOT AT ALL
SUM OF ALL RESPONSES TO PHQ QUESTIONS 1-9: 0
2. FEELING DOWN, DEPRESSED OR HOPELESS: NOT AT ALL

## 2025-04-29 NOTE — PROGRESS NOTES
Sean Guo  Identified pt with two pt identifiers(name and ).  Chief Complaint   Patient presents with    Annual Exam     . Have you been to the ER, urgent care clinic since your last visit?  Hospitalized since your last visit? NO    2. Have you seen or consulted any other health care providers outside of the [facility] since your last visit?  Include any pap smears or colon screening. NO    Patient does not have an advance directive.    Vitals reviewed with provider.    Health Maintenance reviewed:     Health Maintenance Due   Topic    Colorectal Cancer Screen     Depression Screen     Lipids           Wt Readings from Last 3 Encounters:   25 90.1 kg (198 lb 11.2 oz)   24 87.5 kg (193 lb)   24 87.2 kg (192 lb 3.2 oz)        Temp Readings from Last 3 Encounters:   25 97.9 °F (36.6 °C) (Oral)   24 97.8 °F (36.6 °C) (Oral)   24 97.8 °F (36.6 °C) (Oral)        BP Readings from Last 3 Encounters:   24 133/86   24 135/84   24 (!) 143/89        Pulse Readings from Last 3 Encounters:   24 72   24 66   24 59             No data to display                  Learning Assessment:         2024     8:30 AM   [facility] AMB LEARNING ASSESSMENT   Primary Learner Patient   level of education 4 YEARS OF COLLEGE   Primary Language ENGLISH   Learning Preference DEMONSTRATION   Answered By Patient   Relationship to Learner SELF         Fall Risk Assessment:   :          No data to display                Abuse Screening:   :          No data to display                   ADL Screening:   :         2024     8:00 AM   ADL ASSESSMENT   Feeding yourself No Help Needed   Getting from bed to chair No Help Needed   Getting dressed No Help Needed   Bathing or showering No Help Needed   Walk across the room (includes cane/walker) No Help Needed   Using the telphone No Help Needed   Taking your medications No Help Needed   Preparing meals No 
  Return in about 4 months (around 8/29/2025), or if symptoms worsen or fail to improve, for HTN; bring all medications to clinic.       Subjective   History of Present Illness  The patient is a 59-year-old male presenting for an annual physical exam.    Reports overall good health except for mild morning allergies to tree pollen. Diagnosed with sleep apnea, uses CPAP machine. Attends physical therapy for left wrist injury from tennis, gradually improving.    For exercise, plays tennis weekly and walks 2 miles daily. No recent chest pain, shortness of breath, palpitations, dizziness, or leg swelling. Occasional hand swelling in hot weather.     Unsure what medications he is taking for blood pressure, knows he is definitely taking enalapril. Thinks he currently takes enalapril 20 mg twice daily and hydrochlorothiazide 25 mg every other day. Takes hydrochlorothiazide every other day because he reports it causes facial puffiness.  Cannot remember who prescribed hydrochlorothiazide to him. At last appointment with me on 03/27/2024, he was instructed to take enalapril 20 mg twice daily and amlodipine 2.5 mg twice daily. Takes Super Beets gummies twice daily for blood pressure. Reports satisfactory blood pressure control at home, typically around 125/89. No recent cardiologist consultations or urgent care visits. Was taken off hydrochlorothiazide in 2021 due to kidney stones.     Anxiety managed well, hydroxyzine not required recently.    Takes alfuzosin nightly for prostate, effective but could be more so. No recent consultations with Dr. Saucedo at [facility].    Regular ophthalmologist and dentist appointments maintained. Received influenza vaccine, COVID-19 booster, and pneumococcal vaccine this year.Last colonoscopy was in 2014, due for another.     FAMILY HISTORY  Mother has type 2 diabetes though she is normal weight.    Review of Systems   A complete review of systems was performed and is negative except for

## 2025-04-29 NOTE — TELEPHONE ENCOUNTER
Future Appointments:  Future Appointments   Date Time Provider Department Center   4/29/2025  9:10 AM Laine Bland MD [facility] BS E[facility] DEP        Last Appointment With Me:  3/27/2024     Requested Prescriptions     Pending Prescriptions Disp Refills    alfuzosin (UROXATRAL) 10 MG extended release tablet [Pharmacy Med Name: ALFUZOSIN HCL ER 10 MG TABLET] 90 tablet 0     Sig: TAKE 1 TABLET BY MOUTH DAILY AFTER A MEAL AT THE SAME TIME EACH DAY.

## 2025-04-30 LAB
ALBUMIN SERPL-MCNC: 4.4 G/DL (ref 3.5–5)
ALBUMIN/GLOB SERPL: 1.5 (ref 1.1–2.2)
ALP SERPL-CCNC: 106 U/L (ref 45–117)
ALT SERPL-CCNC: 24 U/L (ref 12–78)
ANION GAP SERPL CALC-SCNC: 6 MMOL/L (ref 2–12)
AST SERPL-CCNC: 19 U/L (ref 15–37)
BASOPHILS # BLD: 0.02 K/UL (ref 0–0.1)
BASOPHILS NFR BLD: 0.5 % (ref 0–1)
BILIRUB SERPL-MCNC: 0.9 MG/DL (ref 0.2–1)
BUN SERPL-MCNC: 14 MG/DL (ref 6–20)
BUN/CREAT SERPL: 14 (ref 12–20)
CALCIUM SERPL-MCNC: 9.5 MG/DL (ref 8.5–10.1)
CHLORIDE SERPL-SCNC: 104 MMOL/L (ref 97–108)
CHOLEST SERPL-MCNC: 141 MG/DL
CO2 SERPL-SCNC: 29 MMOL/L (ref 21–32)
CREAT SERPL-MCNC: 0.99 MG/DL (ref 0.7–1.3)
DIFFERENTIAL METHOD BLD: NORMAL
EOSINOPHIL # BLD: 0.05 K/UL (ref 0–0.4)
EOSINOPHIL NFR BLD: 1.2 % (ref 0–7)
ERYTHROCYTE [DISTWIDTH] IN BLOOD BY AUTOMATED COUNT: 13.2 % (ref 11.5–14.5)
EST. AVERAGE GLUCOSE BLD GHB EST-MCNC: 103 MG/DL
GLOBULIN SER CALC-MCNC: 2.9 G/DL (ref 2–4)
GLUCOSE SERPL-MCNC: 93 MG/DL (ref 65–100)
HBA1C MFR BLD: 5.2 % (ref 4–5.6)
HCT VFR BLD AUTO: 43.5 % (ref 36.6–50.3)
HDLC SERPL-MCNC: 52 MG/DL
HDLC SERPL: 2.7 (ref 0–5)
HGB BLD-MCNC: 14.3 G/DL (ref 12.1–17)
IMM GRANULOCYTES # BLD AUTO: 0.02 K/UL (ref 0–0.04)
IMM GRANULOCYTES NFR BLD AUTO: 0.5 % (ref 0–0.5)
LDLC SERPL CALC-MCNC: 70.4 MG/DL (ref 0–100)
LYMPHOCYTES # BLD: 1.26 K/UL (ref 0.8–3.5)
LYMPHOCYTES NFR BLD: 29 % (ref 12–49)
MCH RBC QN AUTO: 30 PG (ref 26–34)
MCHC RBC AUTO-ENTMCNC: 32.9 G/DL (ref 30–36.5)
MCV RBC AUTO: 91.4 FL (ref 80–99)
MONOCYTES # BLD: 0.41 K/UL (ref 0–1)
MONOCYTES NFR BLD: 9.4 % (ref 5–13)
NEUTS SEG # BLD: 2.58 K/UL (ref 1.8–8)
NEUTS SEG NFR BLD: 59.4 % (ref 32–75)
NRBC # BLD: 0 K/UL (ref 0–0.01)
NRBC BLD-RTO: 0 PER 100 WBC
PLATELET # BLD AUTO: 264 K/UL (ref 150–400)
PMV BLD AUTO: 9.7 FL (ref 8.9–12.9)
POTASSIUM SERPL-SCNC: 4.5 MMOL/L (ref 3.5–5.1)
PROT SERPL-MCNC: 7.3 G/DL (ref 6.4–8.2)
PSA SERPL-MCNC: 1.6 NG/ML (ref 0.01–4)
RBC # BLD AUTO: 4.76 M/UL (ref 4.1–5.7)
SODIUM SERPL-SCNC: 139 MMOL/L (ref 136–145)
TRIGL SERPL-MCNC: 93 MG/DL
VLDLC SERPL CALC-MCNC: 18.6 MG/DL
WBC # BLD AUTO: 4.3 K/UL (ref 4.1–11.1)

## 2025-05-01 LAB — TSH SERPL DL<=0.05 MIU/L-ACNC: 2.06 UIU/ML (ref 0.45–4.5)

## 2025-05-02 ENCOUNTER — RESULTS FOLLOW-UP (OUTPATIENT)
Facility: CLINIC | Age: 60
End: 2025-05-02

## 2025-05-26 DIAGNOSIS — I10 ESSENTIAL HYPERTENSION: ICD-10-CM

## 2025-05-26 RX ORDER — AMLODIPINE BESYLATE 5 MG/1
2.5 TABLET ORAL DAILY
Qty: 45 TABLET | Refills: 1 | Status: SHIPPED | OUTPATIENT
Start: 2025-05-26

## 2025-05-31 DIAGNOSIS — N13.8 BENIGN PROSTATIC HYPERPLASIA WITH URINARY OBSTRUCTION: ICD-10-CM

## 2025-05-31 DIAGNOSIS — N40.1 BENIGN PROSTATIC HYPERPLASIA WITH URINARY OBSTRUCTION: ICD-10-CM

## 2025-06-02 RX ORDER — ALFUZOSIN HYDROCHLORIDE 10 MG/1
TABLET, EXTENDED RELEASE ORAL
Qty: 90 TABLET | Refills: 1 | Status: SHIPPED | OUTPATIENT
Start: 2025-06-02 | End: 2025-06-04 | Stop reason: SDUPTHER

## 2025-06-04 DIAGNOSIS — E78.2 MIXED HYPERLIPIDEMIA: ICD-10-CM

## 2025-06-04 DIAGNOSIS — N13.8 BENIGN PROSTATIC HYPERPLASIA WITH URINARY OBSTRUCTION: ICD-10-CM

## 2025-06-04 DIAGNOSIS — N40.1 BENIGN PROSTATIC HYPERPLASIA WITH URINARY OBSTRUCTION: ICD-10-CM

## 2025-06-04 DIAGNOSIS — I10 ESSENTIAL (PRIMARY) HYPERTENSION: ICD-10-CM

## 2025-06-04 RX ORDER — ENALAPRIL MALEATE 20 MG/1
20 TABLET ORAL 2 TIMES DAILY
Qty: 180 TABLET | Refills: 3 | Status: SHIPPED | OUTPATIENT
Start: 2025-06-04

## 2025-06-04 RX ORDER — ALFUZOSIN HYDROCHLORIDE 10 MG/1
TABLET, EXTENDED RELEASE ORAL
Qty: 90 TABLET | Refills: 1 | Status: SHIPPED | OUTPATIENT
Start: 2025-06-04

## 2025-06-04 RX ORDER — ATORVASTATIN CALCIUM 80 MG/1
80 TABLET, FILM COATED ORAL DAILY
Qty: 90 TABLET | Refills: 3 | Status: SHIPPED | OUTPATIENT
Start: 2025-06-04

## 2025-06-04 NOTE — TELEPHONE ENCOUNTER
Caller requests Refill of:  atorvastatin (LIPITOR) 80 MG tablet   alfuzosin (UROXATRAL) 10 MG extended release table   enalapril (VASOTEC) 20 MG tablet     Please send to:  [facility] [address]      Visit Appointment History:  Future Appointments:  Future Appointments   Date Time Provider Department Center   9/3/2025  8:10 AM Laine Bland MD [facility] ECC DEP         Last Appointment With Me:  4/29/2025         Caller confirmed instructions and dosages as correct.    Caller was advised that Meds will be refilled as soon as possible, however there can be a 48-72 business hour turn around on refill requests.

## 2025-06-04 NOTE — TELEPHONE ENCOUNTER
PCP: Laine Bland MD     Last appt:  4/29/2025      Future Appointments   Date Time Provider Department Center   9/3/2025  8:10 AM Laine Bland MD [facility] DEP          Requested Prescriptions     Pending Prescriptions Disp Refills    alfuzosin (UROXATRAL) 10 MG extended release tablet 90 tablet 1     Sig: TAKE 1 TABLET BY MOUTH DAILY AFTER A MEAL AT THE SAME TIME EACH DAY.    atorvastatin (LIPITOR) 80 MG tablet 90 tablet 3     Sig: Take 1 tablet by mouth daily    enalapril (VASOTEC) 20 MG tablet 180 tablet 0     Sig: Take 1 tablet by mouth 2 times daily

## 2025-07-17 ENCOUNTER — TELEPHONE (OUTPATIENT)
Age: 60
End: 2025-07-17

## 2025-07-17 DIAGNOSIS — G47.33 OBSTRUCTIVE SLEEP APNEA (ADULT) (PEDIATRIC): Primary | ICD-10-CM

## 2025-09-02 ENCOUNTER — TELEMEDICINE (OUTPATIENT)
Age: 60
End: 2025-09-02
Payer: COMMERCIAL

## 2025-09-02 ENCOUNTER — CLINICAL DOCUMENTATION (OUTPATIENT)
Age: 60
End: 2025-09-02

## 2025-09-02 DIAGNOSIS — G47.33 OBSTRUCTIVE SLEEP APNEA (ADULT) (PEDIATRIC): Primary | ICD-10-CM

## 2025-09-02 DIAGNOSIS — I10 ESSENTIAL HYPERTENSION: ICD-10-CM

## 2025-09-02 PROCEDURE — 99214 OFFICE O/P EST MOD 30 MIN: CPT | Performed by: NURSE PRACTITIONER

## 2025-09-02 RX ORDER — BUPROPION HYDROCHLORIDE 150 MG/1
TABLET ORAL
COMMUNITY
End: 2025-09-03

## 2025-09-02 ASSESSMENT — SLEEP AND FATIGUE QUESTIONNAIRES
HOW LIKELY ARE YOU TO NOD OFF OR FALL ASLEEP WHILE LYING DOWN TO REST IN THE AFTERNOON WHEN CIRCUMSTANCES PERMIT: MODERATE CHANCE OF DOZING
HOW LIKELY ARE YOU TO NOD OFF OR FALL ASLEEP WHILE SITTING AND READING: SLIGHT CHANCE OF DOZING
DO YOU HAVE DIFFICULTY OPERATING A MOTOR VEHICLE FOR LONG DISTANCES (GREATER THAN 100 MILES) BECAUSE YOU BECOME SLEEPY: NO
HOW LIKELY ARE YOU TO NOD OFF OR FALL ASLEEP WHILE SITTING INACTIVE IN A PUBLIC PLACE: WOULD NEVER DOZE
HOW LIKELY ARE YOU TO NOD OFF OR FALL ASLEEP WHILE SITTING AND TALKING TO SOMEONE: WOULD NEVER DOZE
DO YOU HAVE DIFFICULTY WATCHING A MOVIE OR VIDEO BECAUSE YOU BECOME SLEEPY OR TIRED: YES, MODERATE
DO YOU HAVE DIFFICULTY BEING AS ACTIVE AS YOU WANT TO BE IN THE EVENING BECAUSE YOU ARE SLEEPY OR TIRED: YES, LITTLE
HOW LIKELY ARE YOU TO NOD OFF OR FALL ASLEEP WHILE WATCHING TV: MODERATE CHANCE OF DOZING
DO YOU HAVE DIFFICULTY CONCENTRATING ON THE THINGS YOU DO BECAUSE YOU ARE SLEEPY OR TIRED: NO
HAS YOUR RELATIONSHIP WITH FAMILY, FRIENDS OR WORK COLLEAGUES BEEN AFFECTED BECAUSE YOU ARE SLEEPY OR TIRED: NO
HOW LIKELY ARE YOU TO NOD OFF OR FALL ASLEEP IN A CAR, WHILE STOPPED FOR A FEW MINUTES IN TRAFFIC: WOULD NEVER DOZE
DO YOU HAVE DIFFICULTY VISITING YOUR FAMILY OR FRIENDS IN THEIR HOME BECAUSE YOU BECOME SLEEPY OR TIRED: NO
HOW LIKELY ARE YOU TO NOD OFF OR FALL ASLEEP WHILE SITTING QUIETLY AFTER LUNCH WITHOUT ALCOHOL: WOULD NEVER DOZE
HOW LIKELY ARE YOU TO NOD OFF OR FALL ASLEEP WHILE SITTING AND TALKING TO SOMEONE: WOULD NEVER DOZE
ESS TOTAL SCORE: 6
HAS YOUR MOOD BEEN AFFECTED BECAUSE YOU ARE SLEEPY OR TIRED: NO
HOW LIKELY ARE YOU TO NOD OFF OR FALL ASLEEP WHILE SITTING AND READING: SLIGHT CHANCE OF DOZING
HOW LIKELY ARE YOU TO NOD OFF OR FALL ASLEEP WHILE SITTING QUIETLY AFTER LUNCH WITHOUT ALCOHOL: WOULD NEVER DOZE
HOW LIKELY ARE YOU TO NOD OFF OR FALL ASLEEP WHILE LYING DOWN TO REST IN THE AFTERNOON WHEN CIRCUMSTANCES PERMIT: MODERATE CHANCE OF DOZING
HOW LIKELY ARE YOU TO NOD OFF OR FALL ASLEEP WHILE WATCHING TV: MODERATE CHANCE OF DOZING
FOSQ SCORE: 18
HOW LIKELY ARE YOU TO NOD OFF OR FALL ASLEEP WHILE SITTING INACTIVE IN A PUBLIC PLACE: WOULD NEVER DOZE
HOW LIKELY ARE YOU TO NOD OFF OR FALL ASLEEP IN A CAR, WHILE STOPPED FOR A FEW MINUTES IN TRAFFIC: WOULD NEVER DOZE
HOW LIKELY ARE YOU TO NOD OFF OR FALL ASLEEP WHEN YOU ARE A PASSENGER IN A CAR FOR AN HOUR WITHOUT A BREAK: SLIGHT CHANCE OF DOZING
HOW LIKELY ARE YOU TO NOD OFF OR FALL ASLEEP WHEN YOU ARE A PASSENGER IN A CAR FOR AN HOUR WITHOUT A BREAK: SLIGHT CHANCE OF DOZING
DO YOU HAVE DIFFICULTY OPERATING A MOTOR VEHICLE FOR SHORT DISTANCES (LESS THAN 100 MILES) BECAUSE YOU BECOME SLEEPY: NO
DO YOU HAVE DIFFICULTY BEING AS ACTIVE AS YOU WANT TO BE IN THE MORNING BECAUSE YOU ARE SLEEPY OR TIRED: NO
DO YOU GENERALLY HAVE DIFFICULTY REMEMBERING THINGS BECAUSE YOU ARE SLEEPY OR TIRED: YES, A LITTLE

## 2025-09-03 ENCOUNTER — OFFICE VISIT (OUTPATIENT)
Facility: CLINIC | Age: 60
End: 2025-09-03
Payer: COMMERCIAL

## 2025-09-03 VITALS
HEART RATE: 67 BPM | BODY MASS INDEX: 25.82 KG/M2 | WEIGHT: 194.8 LBS | DIASTOLIC BLOOD PRESSURE: 82 MMHG | SYSTOLIC BLOOD PRESSURE: 122 MMHG | OXYGEN SATURATION: 100 % | RESPIRATION RATE: 16 BRPM | TEMPERATURE: 97.8 F | HEIGHT: 73 IN

## 2025-09-03 DIAGNOSIS — I10 ESSENTIAL HYPERTENSION: Primary | ICD-10-CM

## 2025-09-03 DIAGNOSIS — G47.33 OBSTRUCTIVE SLEEP APNEA SYNDROME: ICD-10-CM

## 2025-09-03 DIAGNOSIS — E78.2 MIXED HYPERLIPIDEMIA: ICD-10-CM

## 2025-09-03 DIAGNOSIS — L65.9 HAIR LOSS: ICD-10-CM

## 2025-09-03 DIAGNOSIS — N13.8 BENIGN PROSTATIC HYPERPLASIA WITH URINARY OBSTRUCTION: ICD-10-CM

## 2025-09-03 DIAGNOSIS — N40.1 BENIGN PROSTATIC HYPERPLASIA WITH URINARY OBSTRUCTION: ICD-10-CM

## 2025-09-03 PROCEDURE — 3079F DIAST BP 80-89 MM HG: CPT | Performed by: INTERNAL MEDICINE

## 2025-09-03 PROCEDURE — 99214 OFFICE O/P EST MOD 30 MIN: CPT | Performed by: INTERNAL MEDICINE

## 2025-09-03 PROCEDURE — 3074F SYST BP LT 130 MM HG: CPT | Performed by: INTERNAL MEDICINE

## 2025-09-03 RX ORDER — MINOXIDIL 5 %
FOAM (GRAM) TOPICAL
COMMUNITY

## 2025-09-03 ASSESSMENT — PATIENT HEALTH QUESTIONNAIRE - PHQ9
2. FEELING DOWN, DEPRESSED OR HOPELESS: NOT AT ALL
SUM OF ALL RESPONSES TO PHQ QUESTIONS 1-9: 0
1. LITTLE INTEREST OR PLEASURE IN DOING THINGS: NOT AT ALL
SUM OF ALL RESPONSES TO PHQ QUESTIONS 1-9: 0